# Patient Record
Sex: FEMALE | Race: WHITE | NOT HISPANIC OR LATINO | Employment: UNEMPLOYED | URBAN - METROPOLITAN AREA
[De-identification: names, ages, dates, MRNs, and addresses within clinical notes are randomized per-mention and may not be internally consistent; named-entity substitution may affect disease eponyms.]

---

## 2019-07-03 ENCOUNTER — HOSPITAL ENCOUNTER (OUTPATIENT)
Dept: LABOR AND DELIVERY | Facility: HOSPITAL | Age: 24
Discharge: HOME | End: 2019-07-03
Attending: OBSTETRICS & GYNECOLOGY

## 2019-07-03 LAB
ALBUMIN SERPL-MCNC: 2.9 G/DL (ref 3.5–5)
ALBUMIN/GLOB SERPL: 0.9 {RATIO} (ref 1.4–2.6)
ALP SERPL-CCNC: 137 U/L (ref 42–98)
ALT SERPL-CCNC: 10 U/L (ref 10–40)
ANION GAP SERPL CALC-SCNC: 15 MMOL/L (ref 8–19)
AST SERPL-CCNC: 8 U/L (ref 15–50)
BASOPHILS # BLD AUTO: 0.09 10*3/UL (ref 0–0.2)
BASOPHILS NFR BLD AUTO: 0.5 % (ref 0–3)
BILIRUB SERPL-MCNC: <0.15 MG/DL (ref 0.2–1.3)
BUN SERPL-MCNC: 4 MG/DL (ref 5–25)
BUN/CREAT SERPL: 7 {RATIO} (ref 6–20)
CALCIUM SERPL-MCNC: 9.3 MG/DL (ref 8.7–10.4)
CHLORIDE SERPL-SCNC: 104 MMOL/L (ref 99–111)
CONV ABS IMM GRAN: 0.41 10*3/UL (ref 0–0.2)
CONV CO2: 23 MMOL/L (ref 22–32)
CONV CREATININE URINE, RANDOM: 29.7 MG/DL (ref 10–300)
CONV IMMATURE GRAN: 2.1 % (ref 0–1.8)
CONV PROTEIN TO CREATININE RATIO (RANDOM URINE): 0.26 {RATIO} (ref 0–0.1)
CONV TOTAL PROTEIN: 6.1 G/DL (ref 6.3–8.2)
CREAT UR-MCNC: 0.56 MG/DL (ref 0.5–0.9)
DEPRECATED RDW RBC AUTO: 46.3 FL (ref 36.4–46.3)
EOSINOPHIL # BLD AUTO: 0.38 10*3/UL (ref 0–0.7)
EOSINOPHIL # BLD AUTO: 1.9 % (ref 0–7)
ERYTHROCYTE [DISTWIDTH] IN BLOOD BY AUTOMATED COUNT: 14.4 % (ref 11.7–14.4)
GFR SERPLBLD BASED ON 1.73 SQ M-ARVRAT: >60 ML/MIN/{1.73_M2}
GLOBULIN UR ELPH-MCNC: 3.2 G/DL (ref 2–3.5)
GLUCOSE SERPL-MCNC: 93 MG/DL (ref 65–99)
HBA1C MFR BLD: 9.5 G/DL (ref 12–16)
HCT VFR BLD AUTO: 31.7 % (ref 37–47)
LYMPHOCYTES # BLD AUTO: 2.58 10*3/UL (ref 1–5)
MCH RBC QN AUTO: 26 PG (ref 27–31)
MCHC RBC AUTO-ENTMCNC: 30 G/DL (ref 33–37)
MCV RBC AUTO: 86.6 FL (ref 81–99)
MONOCYTES # BLD AUTO: 1.4 10*3/UL (ref 0.2–1.2)
MONOCYTES NFR BLD AUTO: 7.1 % (ref 3–10)
NEUTROPHILS # BLD AUTO: 14.97 10*3/UL (ref 2–8)
NEUTROPHILS NFR BLD AUTO: 75.4 % (ref 30–85)
NRBC CBCN: 0 % (ref 0–0.7)
OSMOLALITY SERPL CALC.SUM OF ELEC: 285 MOSM/KG (ref 273–304)
PLATELET # BLD AUTO: 541 10*3/UL (ref 130–400)
PMV BLD AUTO: 10.3 FL (ref 9.4–12.3)
POTASSIUM SERPL-SCNC: 3.1 MMOL/L (ref 3.5–5.3)
PROT UR-MCNC: 7.6 MG/DL
RBC # BLD AUTO: 3.66 10*6/UL (ref 4.2–5.4)
SODIUM SERPL-SCNC: 139 MMOL/L (ref 135–147)
VARIANT LYMPHS NFR BLD MANUAL: 13 % (ref 20–45)
WBC # BLD AUTO: 19.83 10*3/UL (ref 4.8–10.8)

## 2019-07-04 LAB
PROT 24H UR-MRATE: 165 MG/(24.H) (ref 42–225)
PROT 24H UR-MRATE: 5 MG/DL (ref 0–12)
SPECIMEN VOL 24H UR: 3300 ML

## 2019-07-16 ENCOUNTER — HOSPITAL ENCOUNTER (OUTPATIENT)
Dept: LABOR AND DELIVERY | Facility: HOSPITAL | Age: 24
Discharge: HOME | End: 2019-07-16
Attending: OBSTETRICS & GYNECOLOGY

## 2020-09-17 ENCOUNTER — HOSPITAL ENCOUNTER (OUTPATIENT)
Dept: URGENT CARE | Facility: CLINIC | Age: 25
Discharge: HOME | End: 2020-09-17
Attending: PHYSICIAN ASSISTANT

## 2020-09-17 LAB
CONV HIV-1/ HIV-2: NONREACTIVE
RPR SER QL: NORMAL

## 2020-09-18 LAB
C TRACH RRNA CVX QL NAA+PROBE: NOT DETECTED
N GONORRHOEA DNA SPEC QL NAA+PROBE: DETECTED

## 2020-09-19 LAB
CONV HEPATITIS B SURFACE AG W CONFIRMATION RE: NEGATIVE
HAV IGM SERPL QL IA: NEGATIVE
HBV CORE IGM SERPL QL IA: NEGATIVE
HCV AB SER DONR QL: <0.1 S/CO RATIO (ref 0–0.9)

## 2020-12-23 ENCOUNTER — HOSPITAL ENCOUNTER (OUTPATIENT)
Dept: URGENT CARE | Facility: CLINIC | Age: 25
Discharge: HOME | End: 2020-12-23
Attending: NURSE PRACTITIONER

## 2020-12-25 LAB — SARS-COV-2 RNA SPEC QL NAA+PROBE: NOT DETECTED

## 2021-09-14 ENCOUNTER — OFFICE VISIT (OUTPATIENT)
Dept: FAMILY MEDICINE CLINIC | Facility: CLINIC | Age: 26
End: 2021-09-14

## 2021-09-14 VITALS
DIASTOLIC BLOOD PRESSURE: 80 MMHG | HEIGHT: 62 IN | TEMPERATURE: 97.5 F | BODY MASS INDEX: 45.08 KG/M2 | HEART RATE: 127 BPM | SYSTOLIC BLOOD PRESSURE: 136 MMHG | OXYGEN SATURATION: 98 % | WEIGHT: 245 LBS

## 2021-09-14 DIAGNOSIS — Z34.90 PREGNANCY, UNSPECIFIED GESTATIONAL AGE: ICD-10-CM

## 2021-09-14 DIAGNOSIS — N92.6 MISSED MENSES: Primary | ICD-10-CM

## 2021-09-14 LAB
B-HCG UR QL: POSITIVE
INTERNAL NEGATIVE CONTROL: ABNORMAL
INTERNAL POSITIVE CONTROL: ABNORMAL
Lab: ABNORMAL

## 2021-09-14 PROCEDURE — 99203 OFFICE O/P NEW LOW 30 MIN: CPT | Performed by: FAMILY MEDICINE

## 2021-09-14 PROCEDURE — 81025 URINE PREGNANCY TEST: CPT | Performed by: FAMILY MEDICINE

## 2021-09-14 RX ORDER — ALBUTEROL SULFATE 90 UG/1
2 AEROSOL, METERED RESPIRATORY (INHALATION) EVERY 4 HOURS PRN
COMMUNITY
End: 2021-10-14 | Stop reason: SDUPTHER

## 2021-09-14 RX ORDER — PNV NO.95/FERROUS FUM/FOLIC AC 28MG-0.8MG
1 TABLET ORAL DAILY
Qty: 90 TABLET | Refills: 2 | Status: SHIPPED | OUTPATIENT
Start: 2021-09-14 | End: 2022-02-11

## 2021-09-14 NOTE — PROGRESS NOTES
"Chief Complaint    Possible Pregnancy    Subjective      Lovely Daniels presents to [facility]     History of Present Illness    1.) MISSED MENSES : FDLMP - 'sometime in April 2021.' Patient reports irregular menses. Here to confirm suspected pregnancy. Also reports that she would like to switch her primary care to this office - reports 'not being happy with her current practice.'    Objective      Vital Signs:     /80   Pulse (!) 127   Temp 97.5 °F (36.4 °C)   Ht 156.2 cm (61.5\")   Wt 111 kg (245 lb)   SpO2 98%   BMI 45.54 kg/m²       Physical Exam  Vitals reviewed.   Constitutional:       General: She is not in acute distress.     Appearance: Normal appearance. She is well-developed.      Comments: Morbid obesity    HENT:      Head: Normocephalic and atraumatic.      Right Ear: Hearing and external ear normal.      Left Ear: Hearing and external ear normal.      Nose: Nose normal.   Eyes:      General: Lids are normal.         Right eye: No discharge.         Left eye: No discharge.      Conjunctiva/sclera: Conjunctivae normal.   Pulmonary:      Effort: Pulmonary effort is normal.   Abdominal:      General: There is no distension.   Musculoskeletal:         General: No swelling.      Cervical back: Neck supple.   Skin:     Coloration: Skin is not jaundiced.      Findings: No erythema.   Neurological:      Mental Status: She is alert. Mental status is at baseline.   Psychiatric:         Mood and Affect: Mood and affect normal.         Thought Content: Thought content normal.     Assessment and Plan    Diagnoses and all orders for this visit:    1. Missed menses (Primary)  Comments:  1.) (+) Urine pregnancy test here in office.   Orders:  -     POCT pregnancy, urine  -     Ambulatory Referral to Obstetrics / Gynecology    2. Pregnancy, unspecified gestational age  Comments:  1.) Start prenatal vitamins - referred to OB - return to this office for primary care after " birth.  Orders:  -     Ambulatory Referral to Obstetrics / Gynecology    Other orders  -     Prenatal Vit-Fe Fumarate-FA (Prenatal Vitamin) 27-0.8 MG tablet; Take 1 tablet by mouth Daily.  Dispense: 90 tablet; Refill: 2    Patient was given instructions and counseling regarding her condition or for health maintenance advice. Please see specific information pulled into the AVS if appropriate.

## 2021-09-21 ENCOUNTER — INITIAL PRENATAL (OUTPATIENT)
Dept: OBSTETRICS AND GYNECOLOGY | Facility: CLINIC | Age: 26
End: 2021-09-21

## 2021-09-21 VITALS
DIASTOLIC BLOOD PRESSURE: 84 MMHG | BODY MASS INDEX: 45.27 KG/M2 | WEIGHT: 246 LBS | HEIGHT: 62 IN | SYSTOLIC BLOOD PRESSURE: 128 MMHG

## 2021-09-21 DIAGNOSIS — Z32.01 PREGNANCY TEST PERFORMED, PREGNANCY CONFIRMED: Primary | ICD-10-CM

## 2021-09-21 DIAGNOSIS — E66.01 MORBID OBESITY WITH BMI OF 40.0-44.9, ADULT (HCC): ICD-10-CM

## 2021-09-21 DIAGNOSIS — Z34.80 SUPERVISION OF OTHER NORMAL PREGNANCY: ICD-10-CM

## 2021-09-21 DIAGNOSIS — O09.30 LATE PRENATAL CARE: ICD-10-CM

## 2021-09-21 PROBLEM — Z98.891 PREVIOUS CESAREAN SECTION: Status: ACTIVE | Noted: 2021-09-21

## 2021-09-21 PROBLEM — O09.299 HX OF PREECLAMPSIA, PRIOR PREGNANCY, CURRENTLY PREGNANT: Status: ACTIVE | Noted: 2021-09-21

## 2021-09-21 LAB
ABO GROUP BLD: NORMAL
B-HCG UR QL: POSITIVE
BASOPHILS # BLD AUTO: 0.09 10*3/MM3 (ref 0–0.2)
BASOPHILS NFR BLD AUTO: 0.5 % (ref 0–1.5)
BLD GP AB SCN SERPL QL: NEGATIVE
DEPRECATED RDW RBC AUTO: 46.1 FL (ref 37–54)
EOSINOPHIL # BLD AUTO: 0.39 10*3/MM3 (ref 0–0.4)
EOSINOPHIL NFR BLD AUTO: 2.3 % (ref 0.3–6.2)
ERYTHROCYTE [DISTWIDTH] IN BLOOD BY AUTOMATED COUNT: 15.6 % (ref 12.3–15.4)
GLUCOSE UR STRIP-MCNC: NEGATIVE MG/DL
HBV SURFACE AG SERPL QL IA: NORMAL
HCT VFR BLD AUTO: 32.4 % (ref 34–46.6)
HCV AB SER DONR QL: NORMAL
HGB BLD-MCNC: 10.4 G/DL (ref 12–15.9)
HIV1+2 AB SER QL: NORMAL
IMM GRANULOCYTES # BLD AUTO: 0.43 10*3/MM3 (ref 0–0.05)
IMM GRANULOCYTES NFR BLD AUTO: 2.5 % (ref 0–0.5)
INTERNAL NEGATIVE CONTROL: ABNORMAL
INTERNAL POSITIVE CONTROL: ABNORMAL
LYMPHOCYTES # BLD AUTO: 1.99 10*3/MM3 (ref 0.7–3.1)
LYMPHOCYTES NFR BLD AUTO: 11.7 % (ref 19.6–45.3)
Lab: ABNORMAL
MCH RBC QN AUTO: 26.3 PG (ref 26.6–33)
MCHC RBC AUTO-ENTMCNC: 32.1 G/DL (ref 31.5–35.7)
MCV RBC AUTO: 81.8 FL (ref 79–97)
MONOCYTES # BLD AUTO: 0.88 10*3/MM3 (ref 0.1–0.9)
MONOCYTES NFR BLD AUTO: 5.2 % (ref 5–12)
N GONORRHOEA DNA SPEC QL NAA+PROBE: NORMAL
NEUTROPHILS NFR BLD AUTO: 13.28 10*3/MM3 (ref 1.7–7)
NEUTROPHILS NFR BLD AUTO: 77.8 % (ref 42.7–76)
NRBC BLD AUTO-RTO: 0.1 /100 WBC (ref 0–0.2)
PLATELET # BLD AUTO: 330 10*3/MM3 (ref 140–450)
PMV BLD AUTO: 12.4 FL (ref 6–12)
PROT UR STRIP-MCNC: NEGATIVE MG/DL
RBC # BLD AUTO: 3.96 10*6/MM3 (ref 3.77–5.28)
RH BLD: POSITIVE
WBC # BLD AUTO: 17.06 10*3/MM3 (ref 3.4–10.8)

## 2021-09-21 PROCEDURE — G0432 EIA HIV-1/HIV-2 SCREEN: HCPCS | Performed by: ADVANCED PRACTICE MIDWIFE

## 2021-09-21 PROCEDURE — 81025 URINE PREGNANCY TEST: CPT | Performed by: ADVANCED PRACTICE MIDWIFE

## 2021-09-21 PROCEDURE — 99214 OFFICE O/P EST MOD 30 MIN: CPT | Performed by: ADVANCED PRACTICE MIDWIFE

## 2021-09-21 PROCEDURE — 87086 URINE CULTURE/COLONY COUNT: CPT | Performed by: ADVANCED PRACTICE MIDWIFE

## 2021-09-21 PROCEDURE — 86803 HEPATITIS C AB TEST: CPT | Performed by: ADVANCED PRACTICE MIDWIFE

## 2021-09-21 RX ORDER — ASPIRIN 81 MG/1
81 TABLET ORAL DAILY
Qty: 30 TABLET | Refills: 5 | Status: SHIPPED | OUTPATIENT
Start: 2021-09-21 | End: 2022-01-10 | Stop reason: HOSPADM

## 2021-09-21 NOTE — PROGRESS NOTES
"OB Initial Visit    CC- Here for care of current pregnancy     TIM Finalized: Unable to finalize dates, needs dating U/S    Subjective:  26 y.o.  presenting for her first obstetrical visit.    LMP: Patient's last menstrual period was 2021 (approximate). irregular    COMPLAINS OF: Pt has no complaints, is doing well    Reviewed and updated:  OBHx, GYNHx (STDs), PMHx, Medications, Allergies, PSHx, Social Hx, Preventative Hx (PAP), Hx of abuse/safe environment, Vaccine Hx including hx of chickenpox or vaccine, Genetic Hx (pt, FOB, both families).        Objective:  /84   Ht 157.5 cm (62\")   Wt 112 kg (246 lb)   LMP 2021 (Approximate)   BMI 44.99 kg/m²   General- NAD, alert and oriented, appropriate  Psych- Normal mood, good memory  Neck- No masses, no thyroid enlargement  CV- Regular rhythm, no murnurs  Resp- CTA to bases, no wheezes  Abdomen- Soft, non distended, non tender, no masses     External genitalia- Normal, no lesions  Urethra- Normal, no masses, non tender  Vagina- Normal, no discharge  Bladder- Normal, no masses, non tender  Cvx- Normal, no lesions, no discharge, no CMT  Uterus- Consistent with dates  Adnexa- Normal, no mass, non tender    Lymphatic- No palpable neck, axillary, or groin nodes  Ext- No edema, no cyanosis    Skin- No lesions, no rashes, no acanthosis nigricans      Assessment and Plan:  Diagnoses and all orders for this visit:    1. Pregnancy test performed, pregnancy confirmed (Primary)  -     POC Pregnancy, Urine    2. Supervision of other normal pregnancy  -     POC Urinalysis Dipstick  -     Urine Culture - Urine, Urine, Clean Catch  -     IGP,CtNgTv,rfx Aptima HPV ASCU  -     OB Panel With HIV  -     US Ob Detail Fetal Anatomy Single or First Gestation; Future    3. Late prenatal care    4. Morbid obesity with BMI of 40.0-44.9, adult (CMS/[facility])    Other orders  -     aspirin (aspirin) 81 MG EC tablet; Take 1 tablet by mouth Daily.  Dispense: 30 tablet; Refill: " 5            21w3d    Genetic Screening: Declined    Counseling: Nutrition discussed, calories, activity/exercise in pregnancy, Discussed dietary restrictions/safety food preparation in pregnancy, Reviewed what to expect prenatal visits, office providers, Appropriate trimester precautions provided, N/V, vag bleeding, cramping, Questions answered    Labs: Prenatal labs, cultures, and PAP performed (prn)    Return in about 2 weeks (around 10/5/2021) for Next scheduled follow up.      Adelina Jimenez CNM  09/21/2021

## 2021-09-22 ENCOUNTER — TELEPHONE (OUTPATIENT)
Dept: OBSTETRICS AND GYNECOLOGY | Facility: CLINIC | Age: 26
End: 2021-09-22

## 2021-09-22 LAB
BACTERIA SPEC AEROBE CULT: NO GROWTH
RPR SER QL: NORMAL

## 2021-09-22 RX ORDER — FERROUS SULFATE 325(65) MG
325 TABLET ORAL EVERY OTHER DAY
Qty: 30 TABLET | Refills: 5 | Status: SHIPPED | OUTPATIENT
Start: 2021-09-22 | End: 2022-02-11

## 2021-09-22 NOTE — TELEPHONE ENCOUNTER
Script sent, thanks Writer attempted to call report, RN was busy. Writer is also having EDT attempt a new line at this time. TL aware.

## 2021-09-22 NOTE — TELEPHONE ENCOUNTER
----- Message from Adelina Jimenez CNM sent at 9/22/2021  9:17 AM EDT -----  This patient will need a pharmacy entered so I can send a script for iron. Thanks

## 2021-09-23 LAB — RUBV IGG SERPL IA-ACNC: 2.61 INDEX

## 2021-09-27 LAB
C TRACH RRNA CVX QL NAA+PROBE: NEGATIVE
CONV .: NORMAL
CYTOLOGIST CVX/VAG CYTO: NORMAL
CYTOLOGY CVX/VAG DOC CYTO: NORMAL
CYTOLOGY CVX/VAG DOC THIN PREP: NORMAL
DX ICD CODE: NORMAL
HIV 1 & 2 AB SER-IMP: NORMAL
N GONORRHOEA RRNA CVX QL NAA+PROBE: NEGATIVE
OTHER STN SPEC: NORMAL
PATHOLOGIST CVX/VAG CYTO: NORMAL
STAT OF ADQ CVX/VAG CYTO-IMP: NORMAL
T VAGINALIS RRNA SPEC QL NAA+PROBE: NEGATIVE

## 2021-10-01 ENCOUNTER — TELEPHONE (OUTPATIENT)
Dept: OBSTETRICS AND GYNECOLOGY | Facility: CLINIC | Age: 26
End: 2021-10-01

## 2021-10-01 RX ORDER — METRONIDAZOLE 500 MG/1
500 TABLET ORAL 2 TIMES DAILY
Qty: 14 TABLET | Refills: 0 | Status: SHIPPED | OUTPATIENT
Start: 2021-10-01 | End: 2021-10-08

## 2021-10-01 NOTE — TELEPHONE ENCOUNTER
----- Message from Adelina Jimenez CNM sent at 10/1/2021 10:06 AM EDT -----  Please notify patient of positive Bacterial Vaginosis and to  prescription from pharmacy for Flagyl. Thanks

## 2021-10-04 ENCOUNTER — TELEPHONE (OUTPATIENT)
Dept: OBSTETRICS AND GYNECOLOGY | Facility: CLINIC | Age: 26
End: 2021-10-04

## 2021-10-06 NOTE — TELEPHONE ENCOUNTER
Caller: Lovely Daniels    Relationship: Self      Medication requested (name and dosage):   ALBUTEROL FOR THE NEBULIZER     Pharmacy where request should be sent:   [address] - [phone]  - [phone]   [phone]    Additional details provided by patient: PATIENT SAID SHE HAD TESTED POSITVE FOR COVID AND HAD BEEN USING HER NEBULIZER TO HELP HER BREATHING AND SHE IS STILL HAVE A LITTLE ISSUE WITH THE BREATHING AND WOULD LIKE TO HAVE THIS REFILLED     Best call back number: [phone]    Does the patient have less than a 3 day supply:  [x] Yes  [] No    Tricia TAYLOR Rep   10/06/21 11:29 EDT

## 2021-10-14 ENCOUNTER — TELEPHONE (OUTPATIENT)
Dept: FAMILY MEDICINE CLINIC | Facility: CLINIC | Age: 26
End: 2021-10-14

## 2021-10-14 RX ORDER — ALBUTEROL SULFATE 90 UG/1
2 AEROSOL, METERED RESPIRATORY (INHALATION) EVERY 4 HOURS PRN
Qty: 81 G | Refills: 0 | Status: SHIPPED | OUTPATIENT
Start: 2021-10-14 | End: 2021-11-13

## 2021-10-14 NOTE — TELEPHONE ENCOUNTER
[email or web address], [address] - [phone]  - [phone]  [phone]    Pharmacy representative name: MELO    Pharmacy representative phone number: [phone]    What medication are you calling in regards to: albuterol sulfate  (90 Base) MCG/ACT inhaler    What question does the pharmacy have:CLARIFICATION OF QUANTITY, SENT AS 81 GRAMS AND THESE ARE 18 GRAMS

## 2021-10-15 ENCOUNTER — ROUTINE PRENATAL (OUTPATIENT)
Dept: OBSTETRICS AND GYNECOLOGY | Facility: CLINIC | Age: 26
End: 2021-10-15

## 2021-10-15 VITALS — WEIGHT: 259 LBS | DIASTOLIC BLOOD PRESSURE: 90 MMHG | BODY MASS INDEX: 47.37 KG/M2 | SYSTOLIC BLOOD PRESSURE: 138 MMHG

## 2021-10-15 DIAGNOSIS — E66.01 MORBID OBESITY WITH BMI OF 40.0-44.9, ADULT (HCC): ICD-10-CM

## 2021-10-15 DIAGNOSIS — Z34.80 SUPERVISION OF OTHER NORMAL PREGNANCY, ANTEPARTUM: ICD-10-CM

## 2021-10-15 DIAGNOSIS — O09.30 LATE PRENATAL CARE: Primary | ICD-10-CM

## 2021-10-15 LAB
GLUCOSE UR STRIP-MCNC: NEGATIVE MG/DL
PROT UR STRIP-MCNC: NEGATIVE MG/DL

## 2021-10-15 PROCEDURE — 99214 OFFICE O/P EST MOD 30 MIN: CPT | Performed by: ADVANCED PRACTICE MIDWIFE

## 2021-10-15 RX ORDER — METRONIDAZOLE 500 MG/1
TABLET ORAL
COMMUNITY
End: 2021-10-29

## 2021-10-15 NOTE — PROGRESS NOTES
OB FOLLOW UP  CC- Here for care of pregnancy        Lovely Daniels is a 26 y.o.  24w6d patient being seen today for her obstetrical follow up visit. Patient reports no complaints.     Anatomy ultrasound reviewed, EFW 35.5%, all anatomy visualized within normal limits except unable to visualize profile and RVOT, will repeat ultrasound at next office visit      Her prenatal care is complicated by (and status) :    Patient Active Problem List   Diagnosis   • Morbid obesity with BMI of 40.0-44.9, adult (HCC)   • Hx of preeclampsia, prior pregnancy, currently pregnant   • Previous  section       ROS -   Patient Reports : No Problems  Patient Denies: Loss of Fluid, Vaginal Spotting and Contractions  Fetal Movement : normal  All other systems reviewed and are negative.       The additional following portions of the patient's history were reviewed and updated as appropriate: allergies, current medications, past family history, past medical history, past social history, past surgical history and problem list.    I have reviewed and agree with the HPI, ROS, and historical information as entered above. Adelina Jimenez CNM    /90   Wt 117 kg (259 lb)   LMP 2021 (Approximate)   BMI 47.37 kg/m²       EXAM:     FHT: 155 BPM   Uterine Size: 25 cm  Pelvic Exam: No    Urine glucose/protein: See prenatal flowsheet       Assessment and Plan    Problem List Items Addressed This Visit        Endocrine and Metabolic    Morbid obesity with BMI of 40.0-44.9, adult (HCC)      Other Visit Diagnoses     Late prenatal care    -  Primary    Relevant Orders    POC Urinalysis Dipstick (Completed)    Supervision of other normal pregnancy, antepartum        Relevant Orders    Gestational Diabetes Screen    CBC (No Diff)    US Ob 14 + Weeks Single or First Gestation          1. Pregnancy at 24w6d  2. Fetal status reassuring.   3. Patient to have Glucola and CBC done at the hospital this week due to no lab available in  office today  4. Return in about 2 weeks (around 10/29/2021) for Repeat U/S for profile and RVOT with F/U.    Adelina Jimenez CNM  10/15/2021

## 2021-10-29 ENCOUNTER — ROUTINE PRENATAL (OUTPATIENT)
Dept: OBSTETRICS AND GYNECOLOGY | Facility: CLINIC | Age: 26
End: 2021-10-29

## 2021-10-29 VITALS — WEIGHT: 261 LBS | SYSTOLIC BLOOD PRESSURE: 133 MMHG | DIASTOLIC BLOOD PRESSURE: 91 MMHG | BODY MASS INDEX: 47.74 KG/M2

## 2021-10-29 DIAGNOSIS — O09.299 HX OF PREECLAMPSIA, PRIOR PREGNANCY, CURRENTLY PREGNANT: ICD-10-CM

## 2021-10-29 DIAGNOSIS — J45.909 UNCOMPLICATED ASTHMA, UNSPECIFIED ASTHMA SEVERITY, UNSPECIFIED WHETHER PERSISTENT: ICD-10-CM

## 2021-10-29 DIAGNOSIS — Z98.891 PREVIOUS CESAREAN SECTION: ICD-10-CM

## 2021-10-29 DIAGNOSIS — Z34.80 SUPERVISION OF OTHER NORMAL PREGNANCY: Primary | ICD-10-CM

## 2021-10-29 DIAGNOSIS — E66.01 MORBID OBESITY WITH BMI OF 45.0-49.9, ADULT (HCC): ICD-10-CM

## 2021-10-29 DIAGNOSIS — F17.200 SMOKER: ICD-10-CM

## 2021-10-29 DIAGNOSIS — O09.32 SUPERVISION OF PREGNANCY WITH INSUFFICIENT ANTENATAL CARE IN SECOND TRIMESTER: ICD-10-CM

## 2021-10-29 DIAGNOSIS — U07.1 COVID-19: ICD-10-CM

## 2021-10-29 LAB
GLUCOSE UR STRIP-MCNC: NEGATIVE MG/DL
PROT UR STRIP-MCNC: NEGATIVE MG/DL

## 2021-10-29 PROCEDURE — 99214 OFFICE O/P EST MOD 30 MIN: CPT | Performed by: STUDENT IN AN ORGANIZED HEALTH CARE EDUCATION/TRAINING PROGRAM

## 2021-10-29 NOTE — PROGRESS NOTES
OB FOLLOW UP  Complaint No chief complaint on file.           Lovely Daniels is a 26 y.o.  26w6d patient being seen today for her obstetrical follow up visit. Patient denies decreased fetal movement, contractions, loss of fluid or vaginal bleeding.  Patient denies any headache, visual disturbances, new onset nausea vomiting, right upper quadrant pain, or new onset swelling.      Her prenatal care is complicated by (and status) :    Patient Active Problem List   Diagnosis   • Morbid obesity with BMI of 45.0-49.9, adult ([facility])   • Hx of preeclampsia, prior pregnancy, currently pregnant   • Previous  section   • COVID-19   • Asthma   • Smoker   • Supervision of pregnancy with insufficient  care in second trimester       All other systems reviewed and are negative.     The additional following portions of the patient's history were reviewed and updated as appropriate: allergies, current medications, past family history, past medical history, past social history, past surgical history and problem list.      EXAM:     Vital signs: /91   Wt 118 kg (261 lb)   LMP 2021 (Approximate)   BMI 47.74 kg/m²   Appearance/psychiatric: To be in no distress  Constitutional: The patient is well nourished.  Cardiovascular: She does not have edema.  Respiratory: Respiratory effort is normal.  Gastrointestinal: Abdomen is soft, gravid, nontender, no rashes, heart tones are present, fundal height is I would be obtained secondary to obesity    Pelvic Exam: No    Urine glucose/protein: See prenatal flowsheet       Assessment and Plan    Problem List Items Addressed This Visit        Endocrine and Metabolic    Morbid obesity with BMI of 45.0-49.9, adult ([facility])    Overview     10/29/2021 question of exercise in pregnancy.  Recommendation of 30 minutes x 5 days a week            Gravid and     Supervision of pregnancy with insufficient  care in second trimester    Overview     Initial  visit:  • PNL: wnl   • PAP/GC/CT/Urine culture:  • Blood type: A+/neg   • Hx of HSV: no     Genetic testing:    •     Vaccinations:  • COVID: Recommendation made 10/29/2021   • Influenza: Recommendation made 10/29/2021     24-28 weeks:  10/29/2021  • 1hr GCT:  • Hct/Plt:  • Tdap Rx -given 10/29/2021   • Rhogam indicated:  No     Third Trimester:  • GBS  • Breast pump:  • Contraception: desires tubal ligation     Ultrasound:  • Dating:   • Anatomy:   • Follow up:   o 10/29/2021  (45) AMY 15.6 breech anterior fundal placenta.  Profile left ventricular outflow tract and four-chamber heart within normal limits.  Unable to visualize right ventricular outflow tract.  Repeat ultrasound in 4 weeks for RVOT    PLAN:               Previous  section    Overview     10/29/2021 Desires repeat  section; would like had tubes tied.  Needs to sign paperwork         Hx of preeclampsia, prior pregnancy, currently pregnant    Overview     10/29/2021 compliant with aspirin.  Second elevation in blood pressure today 10/29/2021 patient asymptomatic.  Will get baseline labs and firms of CBC and CMP and UPCR.  If elevated UPCR consider doing 24-hour urine collection.         Relevant Orders    Comprehensive Metabolic Panel    Protein / Creatinine Ratio, Urine - Urine, Clean Catch       Pulmonary and Pneumonias    Asthma    Relevant Medications    albuterol sulfate  (90 Base) MCG/ACT inhaler       Tobacco    Smoker    Overview     10/29/2021 3 a day. Recommend cessation.              Other    COVID-19    Overview     10/29/2021 history Covid in August.  Recommendation for Covid vaccination as well as influenza vaccination           Other Visit Diagnoses     Supervision of other normal pregnancy    -  Primary    Relevant Medications    Tdap (ADACEL) 5-2-15.5 LF-MCG/0.5 injection    Other Relevant Orders    POC Urinalysis Dipstick (Completed)    Gestational Diabetes Screen    CBC (No Diff)    US Ob Limited 1 +  Fetuses          Impression  1. Pregnancy at 26w6d  2. Fetal status reassuring.   3. Activity and Exercise discussed.    Plan  1.  Reviewed ultrasound with patient.  Plan for repeat in 4 weeks for RVOT  2.  Smoking cessation discussed with patient.  Discussed methods of being able to stop smoking including setting a quit date, as well as getting rid of objects.  1 800 stop smoking provided to patient  3.  Needs 24-week labs.  Unable to be obtained today is going to get on Monday.  4.  Elevated blood pressure today with concerns for chronic hypertension versus gestational hypertension/preeclampsia.  Patient asymptomatic.  We will get baseline labs with UPCR.  If elevation UPCR would recommend getting 24-hour urine protein collection.  5.  Follow-up 2 weeks    Patient was counseled to the following pregnancy precautions:  • Decreased fetal movement, if concern for decreased fetal movement please perform fetal kick counts you are looking for 10 movements in 2 hours.  If concern for fetal movement and not meeting that criteria, please present to triage for evaluation.  • Contractions occurring every 5 minutes for over an hour, lasting 30 to 60 seconds and progressively causing more discomfort, please seek medical attention to rule out labor  • If you believe that your water is broken, place a sanitary pad.  If pad fills in short period of time i.e. less than 5 minutes, take off pad placed another pad.  If this is saturated please present for rule out rupture of membranes  • Vaginal bleeding can be normal in pregnancy, this usually takes a form of spotting.  If having heavier bleeding like a menstrual period please present for evaluation; especially in light of severe abdominal pain this could represent a placental abruption.  • Keep all scheduled appointments as recommended.        Jaguar Dumont MD  10/29/2021

## 2021-11-01 ENCOUNTER — LAB (OUTPATIENT)
Dept: LAB | Facility: HOSPITAL | Age: 26
End: 2021-11-01

## 2021-11-01 DIAGNOSIS — Z34.80 SUPERVISION OF OTHER NORMAL PREGNANCY: ICD-10-CM

## 2021-11-01 DIAGNOSIS — Z34.80 SUPERVISION OF OTHER NORMAL PREGNANCY, ANTEPARTUM: ICD-10-CM

## 2021-11-01 DIAGNOSIS — O09.299 HX OF PREECLAMPSIA, PRIOR PREGNANCY, CURRENTLY PREGNANT: ICD-10-CM

## 2021-11-01 PROBLEM — O99.019 MATERNAL ANEMIA IN PREGNANCY, ANTEPARTUM: Status: ACTIVE | Noted: 2021-11-01

## 2021-11-01 LAB
ALBUMIN SERPL-MCNC: 3.2 G/DL (ref 3.5–5.2)
ALBUMIN/GLOB SERPL: 1 G/DL
ALP SERPL-CCNC: 139 U/L (ref 39–117)
ALT SERPL W P-5'-P-CCNC: 13 U/L (ref 1–33)
ANION GAP SERPL CALCULATED.3IONS-SCNC: 11.8 MMOL/L (ref 5–15)
AST SERPL-CCNC: 11 U/L (ref 1–32)
BILIRUB SERPL-MCNC: <0.2 MG/DL (ref 0–1.2)
BUN SERPL-MCNC: 8 MG/DL (ref 6–20)
BUN/CREAT SERPL: 14.5 (ref 7–25)
CALCIUM SPEC-SCNC: 8.8 MG/DL (ref 8.6–10.5)
CHLORIDE SERPL-SCNC: 102 MMOL/L (ref 98–107)
CO2 SERPL-SCNC: 22.2 MMOL/L (ref 22–29)
CREAT SERPL-MCNC: 0.55 MG/DL (ref 0.57–1)
CREAT UR-MCNC: 84.9 MG/DL
DEPRECATED RDW RBC AUTO: 47.9 FL (ref 37–54)
ERYTHROCYTE [DISTWIDTH] IN BLOOD BY AUTOMATED COUNT: 15.7 % (ref 12.3–15.4)
GFR SERPL CREATININE-BSD FRML MDRD: 136 ML/MIN/1.73
GLOBULIN UR ELPH-MCNC: 3.1 GM/DL
GLUCOSE 1H P GLC SERPL-MCNC: 158 MG/DL (ref 65–139)
GLUCOSE BLDC GLUCOMTR-MCNC: 113 MG/DL (ref 70–99)
GLUCOSE P FAST SERPL-MCNC: 115 MG/DL (ref 65–94)
GLUCOSE SERPL-MCNC: 115 MG/DL (ref 65–99)
HCT VFR BLD AUTO: 31.5 % (ref 34–46.6)
HGB BLD-MCNC: 9.9 G/DL (ref 12–15.9)
MCH RBC QN AUTO: 26 PG (ref 26.6–33)
MCHC RBC AUTO-ENTMCNC: 31.4 G/DL (ref 31.5–35.7)
MCV RBC AUTO: 82.7 FL (ref 79–97)
PLATELET # BLD AUTO: 332 10*3/MM3 (ref 140–450)
PMV BLD AUTO: 12.5 FL (ref 6–12)
POTASSIUM SERPL-SCNC: 3.5 MMOL/L (ref 3.5–5.2)
PROT SERPL-MCNC: 6.3 G/DL (ref 6–8.5)
PROT UR-MCNC: 14.7 MG/DL
PROT/CREAT UR: 0.2 MG/G{CREAT}
RBC # BLD AUTO: 3.81 10*6/MM3 (ref 3.77–5.28)
SODIUM SERPL-SCNC: 136 MMOL/L (ref 136–145)
WBC # BLD AUTO: 14.49 10*3/MM3 (ref 3.4–10.8)

## 2021-11-01 PROCEDURE — 82570 ASSAY OF URINE CREATININE: CPT | Performed by: STUDENT IN AN ORGANIZED HEALTH CARE EDUCATION/TRAINING PROGRAM

## 2021-11-01 PROCEDURE — 82962 GLUCOSE BLOOD TEST: CPT

## 2021-11-01 PROCEDURE — 82950 GLUCOSE TEST: CPT

## 2021-11-01 PROCEDURE — 85027 COMPLETE CBC AUTOMATED: CPT

## 2021-11-01 PROCEDURE — 82947 ASSAY GLUCOSE BLOOD QUANT: CPT

## 2021-11-01 PROCEDURE — 36415 COLL VENOUS BLD VENIPUNCTURE: CPT

## 2021-11-01 PROCEDURE — 84156 ASSAY OF PROTEIN URINE: CPT | Performed by: STUDENT IN AN ORGANIZED HEALTH CARE EDUCATION/TRAINING PROGRAM

## 2021-11-01 PROCEDURE — 80053 COMPREHEN METABOLIC PANEL: CPT

## 2021-11-02 ENCOUNTER — TELEPHONE (OUTPATIENT)
Dept: OBSTETRICS AND GYNECOLOGY | Facility: CLINIC | Age: 26
End: 2021-11-02

## 2021-11-02 NOTE — TELEPHONE ENCOUNTER
----- Message from Jaguar Dumont MD sent at 11/1/2021  1:59 PM EDT -----  Elevated UPCR with recommendation for 24-hour urine collection.  Please notify patient

## 2021-11-03 ENCOUNTER — TELEPHONE (OUTPATIENT)
Dept: OBSTETRICS AND GYNECOLOGY | Facility: CLINIC | Age: 26
End: 2021-11-03

## 2021-11-03 NOTE — TELEPHONE ENCOUNTER
----- Message from Jaguar Dumont MD sent at 11/1/2021  1:57 PM EDT -----  Patient failed 1 hour glucose testing.  Will need to have 3-hour testing.

## 2021-11-05 ENCOUNTER — TELEPHONE (OUTPATIENT)
Dept: OBSTETRICS AND GYNECOLOGY | Facility: CLINIC | Age: 26
End: 2021-11-05

## 2021-11-05 NOTE — TELEPHONE ENCOUNTER
Patient was in office for an ultrasound. I discussed with patient the need for a 24 hour urine and patient was given the needed supplies. Patient advised to return the sample at her 11/10 appointment.

## 2021-11-10 ENCOUNTER — TELEPHONE (OUTPATIENT)
Dept: OBSTETRICS AND GYNECOLOGY | Facility: CLINIC | Age: 26
End: 2021-11-10

## 2021-11-10 NOTE — TELEPHONE ENCOUNTER
Pt called in requesting her 3hour glucose results. Gave pt glucose reports and that is was normal. Pt understood results.

## 2021-11-22 ENCOUNTER — ROUTINE PRENATAL (OUTPATIENT)
Dept: OBSTETRICS AND GYNECOLOGY | Facility: CLINIC | Age: 26
End: 2021-11-22

## 2021-11-22 VITALS — SYSTOLIC BLOOD PRESSURE: 140 MMHG | WEIGHT: 274 LBS | BODY MASS INDEX: 50.12 KG/M2 | DIASTOLIC BLOOD PRESSURE: 88 MMHG

## 2021-11-22 DIAGNOSIS — R12 HEARTBURN: ICD-10-CM

## 2021-11-22 DIAGNOSIS — Z3A.30 30 WEEKS GESTATION OF PREGNANCY: ICD-10-CM

## 2021-11-22 DIAGNOSIS — O09.33 INSUFFICIENT PRENATAL CARE IN THIRD TRIMESTER: ICD-10-CM

## 2021-11-22 DIAGNOSIS — O99.019 MATERNAL ANEMIA IN PREGNANCY, ANTEPARTUM: ICD-10-CM

## 2021-11-22 DIAGNOSIS — F17.200 SMOKER: Primary | ICD-10-CM

## 2021-11-22 LAB
GLUCOSE UR STRIP-MCNC: NEGATIVE MG/DL
LEUKOCYTE EST, POC: NEGATIVE
NITRITE UR-MCNC: NEGATIVE MG/ML
PROT UR STRIP-MCNC: NEGATIVE MG/DL

## 2021-11-22 PROCEDURE — 99213 OFFICE O/P EST LOW 20 MIN: CPT | Performed by: NURSE PRACTITIONER

## 2021-11-22 RX ORDER — FAMOTIDINE 20 MG/1
20 TABLET, FILM COATED ORAL 2 TIMES DAILY PRN
Qty: 60 TABLET | Refills: 5 | Status: SHIPPED | OUTPATIENT
Start: 2021-11-22 | End: 2022-02-11

## 2021-11-22 NOTE — PROGRESS NOTES
OB FOLLOW UP        Here for OB follow up visit    Subjective:   • No complaints   • Has not collected 24-hour urine yet  • Does need medication for heartburn    Objective:  /88   Wt 124 kg (274 lb)   LMP 2021 (Approximate)   BMI 50.12 kg/m²   Uterine Size: size equals dates  FHT: 110-160 BPM    See OB flow for LE edema, cvx exam if performed, and Upro/Uglu    Assessment and Plan:  30w2d  Reassuring pregnancy progress.  Questions answered.  Diagnoses and all orders for this visit:    1. Smoker (Primary)  Overview:  10/29/2021 3 a day. Recommend cessation.      Orders:  -     POC Urinalysis Dipstick    2. Supervision of pregnancy with insufficient  care in second trimester  Overview:  Initial visit:  • PNL: wnl   • PAP/GC/CT/Urine culture:  • Blood type: A+/neg   • Hx of HSV: no     Genetic testing:    •     Vaccinations:  • COVID: Recommendation made 10/29/2021   • Influenza: Recommendation made 10/29/2021     24-28 weeks:  10/29/2021  • 1hr GCT:  • Hct/Plt:  • Tdap Rx -given 10/29/2021   • Rhogam indicated:  No     Third Trimester:  • GBS  • Breast pump:  • Contraception: desires tubal ligation     Ultrasound:  • Dating:   • Anatomy:   • Follow up:   o 10/29/2021  (45) AMY 15.6 breech anterior fundal placenta.  Profile left ventricular outflow tract and four-chamber heart within normal limits.  Unable to visualize right ventricular outflow tract.  Repeat ultrasound in 4 weeks for RVOT  o 2021 -EFW 1155 (52.8) RVOT appears within normal limits    PLAN:          Orders:  -     POC Urinalysis Dipstick    3. Maternal anemia in pregnancy, antepartum  Overview:  2021 H&H essentially unchanged from 1 month ago.  Reports compliance with iron at last visit.  Recheck CBC in 1 month if CBC remains low recommendation for iron infusion.    Orders:  -     POC Urinalysis Dipstick    4. 30 weeks gestation of pregnancy  -     POC Urinalysis Dipstick    5. Heartburn  -     famotidine (PEPCID)  20 MG tablet; Take 1 tablet by mouth 2 (Two) Times a Day As Needed for Heartburn.  Dispense: 60 tablet; Refill: 5      Counseling:    • OB precautions, leaking, VB, gm ingram vs PTL/Labor  • FKC  • HTN precautions, HA, vision change, RUQ/epigastric pain, edema  • Continue PNV.  Importance of healthy eating and exercise.  • Encourage to collect and submit 24-hour    Return in about 2 weeks (around 12/6/2021) for Next scheduled follow up.            Sergei Hudson, APRN  11/22/2021    [facility]  [facility]  [address] [address]  Dept: [phone]  Loc: [phone]

## 2021-12-09 ENCOUNTER — ROUTINE PRENATAL (OUTPATIENT)
Dept: OBSTETRICS AND GYNECOLOGY | Facility: CLINIC | Age: 26
End: 2021-12-09

## 2021-12-09 VITALS — WEIGHT: 281 LBS | BODY MASS INDEX: 51.4 KG/M2 | SYSTOLIC BLOOD PRESSURE: 138 MMHG | DIASTOLIC BLOOD PRESSURE: 83 MMHG

## 2021-12-09 DIAGNOSIS — O99.019 MATERNAL ANEMIA IN PREGNANCY, ANTEPARTUM: ICD-10-CM

## 2021-12-09 DIAGNOSIS — J45.909 UNCOMPLICATED ASTHMA, UNSPECIFIED ASTHMA SEVERITY, UNSPECIFIED WHETHER PERSISTENT: ICD-10-CM

## 2021-12-09 DIAGNOSIS — O09.32 SUPERVISION OF PREGNANCY WITH INSUFFICIENT ANTENATAL CARE IN SECOND TRIMESTER: ICD-10-CM

## 2021-12-09 DIAGNOSIS — F17.200 SMOKER: ICD-10-CM

## 2021-12-09 LAB
GLUCOSE UR STRIP-MCNC: NEGATIVE MG/DL
PROT UR STRIP-MCNC: NEGATIVE MG/DL

## 2021-12-09 PROCEDURE — 99212 OFFICE O/P EST SF 10 MIN: CPT | Performed by: OBSTETRICS & GYNECOLOGY

## 2021-12-12 NOTE — PROGRESS NOTES
OB FOLLOW UP  CC- Here for care of pregnancy        Lovely Daniels is a 26 y.o.  33w0d patient being seen today for her obstetrical follow up visit. Patient reports no complaints.     Her prenatal care is complicated by (and status) :    Patient Active Problem List   Diagnosis   • Morbid obesity with BMI of 45.0-49.9, adult (HCC)   • Hx of preeclampsia, prior pregnancy, currently pregnant   • Previous  section   • COVID-19   • Asthma   • Smoker   • Supervision of pregnancy with insufficient  care in second trimester   • Maternal anemia in pregnancy, antepartum       Flu Status: Desires at future appt  Covid Status:    ROS -   Patient Reports : No Problems  Patient Denies: Loss of Fluid, Vaginal Spotting, Vision Changes, Headaches, Nausea , Vomiting , Contractions and Epigastric pain  Fetal Movement : normal  All other systems reviewed and are negative.       The additional following portions of the patient's history were reviewed and updated as appropriate: allergies, current medications, past family history, past medical history, past social history, past surgical history and problem list.    I have reviewed and agree with the HPI, ROS, and historical information as entered above. Dariana Rosenthal, DO    /83   Wt 127 kg (281 lb)   LMP 2021 (Approximate)   BMI 51.40 kg/m²       EXAM:     FHT: 141 BPM   Uterine Size: 36 cm  Pelvic Exam: No    Urine glucose/protein: See prenatal flowsheet       Assessment and Plan  Diagnoses and all orders for this visit:    1. Maternal anemia in pregnancy, antepartum  Overview:  2021 H&H essentially unchanged from 1 month ago.  Reports compliance with iron at last visit.  Recheck CBC in 1 month if CBC remains low recommendation for iron infusion.      2. Uncomplicated asthma, unspecified asthma severity, unspecified whether persistent    3. Smoker  Overview:  10/29/2021 3 a day. Recommend cessation.        4. Supervision of pregnancy with  insufficient  care in second trimester  Overview:  Initial visit:  • PNL: wnl   • PAP/GC/CT/Urine culture:  • Blood type: A+/neg   • Hx of HSV: no     Genetic testing:    •     Vaccinations:  • COVID: Recommendation made 10/29/2021   • Influenza: Recommendation made 10/29/2021     24-28 weeks:  10/29/2021  • 1hr GCT:  • Hct/Plt:  • Tdap Rx -given 10/29/2021   • Rhogam indicated:  No     Third Trimester:  • GBS  • Breast pump:  • Contraception: desires tubal ligation     Ultrasound:  • Dating:   • Anatomy:   • Follow up:   o 10/29/2021  (45) AMY 15.6 breech anterior fundal placenta.  Profile left ventricular outflow tract and four-chamber heart within normal limits.  Unable to visualize right ventricular outflow tract.  Repeat ultrasound in 4 weeks for RVOT  o 2021 -EFW 1155 (52.8) RVOT appears within normal limits    PLAN:          Orders:  -     POC Urinalysis Dipstick         1. Pregnancy at 33w0d  2. Fetal status reassuring.   3. Activity and Exercise discussed.  4. Return for Please schedule q 2 wks 36 wk & wks til del, PLEASE SCHEDULE ULTRASOUND AT 36-37 WEEKS.    Dariana Rosenthal, DO  2021

## 2021-12-17 ENCOUNTER — PREP FOR SURGERY (OUTPATIENT)
Dept: OTHER | Facility: HOSPITAL | Age: 26
End: 2021-12-17

## 2021-12-23 ENCOUNTER — TELEPHONE (OUTPATIENT)
Dept: OBSTETRICS AND GYNECOLOGY | Facility: CLINIC | Age: 26
End: 2021-12-23

## 2021-12-23 ENCOUNTER — ROUTINE PRENATAL (OUTPATIENT)
Dept: OBSTETRICS AND GYNECOLOGY | Facility: CLINIC | Age: 26
End: 2021-12-23

## 2021-12-23 ENCOUNTER — HOSPITAL ENCOUNTER (OUTPATIENT)
Facility: HOSPITAL | Age: 26
Discharge: HOME | End: 2021-12-23
Attending: OBSTETRICS & GYNECOLOGY | Admitting: OBSTETRICS & GYNECOLOGY

## 2021-12-23 VITALS — SYSTOLIC BLOOD PRESSURE: 147 MMHG | WEIGHT: 287 LBS | BODY MASS INDEX: 52.49 KG/M2 | DIASTOLIC BLOOD PRESSURE: 89 MMHG

## 2021-12-23 VITALS
TEMPERATURE: 97.8 F | RESPIRATION RATE: 18 BRPM | SYSTOLIC BLOOD PRESSURE: 138 MMHG | HEART RATE: 95 BPM | DIASTOLIC BLOOD PRESSURE: 77 MMHG | OXYGEN SATURATION: 100 %

## 2021-12-23 DIAGNOSIS — O09.32 SUPERVISION OF PREGNANCY WITH INSUFFICIENT ANTENATAL CARE IN SECOND TRIMESTER: ICD-10-CM

## 2021-12-23 DIAGNOSIS — F17.200 SMOKER: ICD-10-CM

## 2021-12-23 DIAGNOSIS — Z34.80 SUPERVISION OF OTHER NORMAL PREGNANCY: Primary | ICD-10-CM

## 2021-12-23 DIAGNOSIS — O09.299 HX OF PREECLAMPSIA, PRIOR PREGNANCY, CURRENTLY PREGNANT: ICD-10-CM

## 2021-12-23 DIAGNOSIS — O99.019 MATERNAL ANEMIA IN PREGNANCY, ANTEPARTUM: ICD-10-CM

## 2021-12-23 PROBLEM — O13.3 GESTATIONAL HYPERTENSION, THIRD TRIMESTER: Status: ACTIVE | Noted: 2021-12-23

## 2021-12-23 LAB
ALBUMIN SERPL-MCNC: 2.9 G/DL (ref 3.5–5.2)
ALBUMIN/GLOB SERPL: 0.9 G/DL
ALP SERPL-CCNC: 239 U/L (ref 39–117)
ALT SERPL W P-5'-P-CCNC: 11 U/L (ref 1–33)
ANION GAP SERPL CALCULATED.3IONS-SCNC: 10.2 MMOL/L (ref 5–15)
AST SERPL-CCNC: 12 U/L (ref 1–32)
BILIRUB SERPL-MCNC: 0.2 MG/DL (ref 0–1.2)
BUN SERPL-MCNC: 7 MG/DL (ref 6–20)
BUN/CREAT SERPL: 12.3 (ref 7–25)
CALCIUM SPEC-SCNC: 9.4 MG/DL (ref 8.6–10.5)
CHLORIDE SERPL-SCNC: 104 MMOL/L (ref 98–107)
CO2 SERPL-SCNC: 20.8 MMOL/L (ref 22–29)
CREAT SERPL-MCNC: 0.57 MG/DL (ref 0.57–1)
CREAT UR-MCNC: 55.3 MG/DL
DEPRECATED RDW RBC AUTO: 48.6 FL (ref 37–54)
ERYTHROCYTE [DISTWIDTH] IN BLOOD BY AUTOMATED COUNT: 17.3 % (ref 12.3–15.4)
GFR SERPL CREATININE-BSD FRML MDRD: 130 ML/MIN/1.73
GLOBULIN UR ELPH-MCNC: 3.2 GM/DL
GLUCOSE SERPL-MCNC: 96 MG/DL (ref 65–99)
GLUCOSE UR STRIP-MCNC: NEGATIVE MG/DL
HCT VFR BLD AUTO: 30.5 % (ref 34–46.6)
HGB BLD-MCNC: 9.4 G/DL (ref 12–15.9)
MCH RBC QN AUTO: 23.8 PG (ref 26.6–33)
MCHC RBC AUTO-ENTMCNC: 30.8 G/DL (ref 31.5–35.7)
MCV RBC AUTO: 77.2 FL (ref 79–97)
PLATELET # BLD AUTO: 362 10*3/MM3 (ref 140–450)
PMV BLD AUTO: 10.3 FL (ref 6–12)
POTASSIUM SERPL-SCNC: 4.1 MMOL/L (ref 3.5–5.2)
PROT ?TM UR-MCNC: 37.5 MG/DL
PROT SERPL-MCNC: 6.1 G/DL (ref 6–8.5)
PROT UR STRIP-MCNC: NEGATIVE MG/DL
PROT/CREAT UR: 0.7 MG/G{CREAT}
RBC # BLD AUTO: 3.95 10*6/MM3 (ref 3.77–5.28)
SODIUM SERPL-SCNC: 135 MMOL/L (ref 136–145)
WBC NRBC COR # BLD: 12.65 10*3/MM3 (ref 3.4–10.8)

## 2021-12-23 PROCEDURE — 82570 ASSAY OF URINE CREATININE: CPT | Performed by: STUDENT IN AN ORGANIZED HEALTH CARE EDUCATION/TRAINING PROGRAM

## 2021-12-23 PROCEDURE — 59025 FETAL NON-STRESS TEST: CPT

## 2021-12-23 PROCEDURE — 85027 COMPLETE CBC AUTOMATED: CPT | Performed by: STUDENT IN AN ORGANIZED HEALTH CARE EDUCATION/TRAINING PROGRAM

## 2021-12-23 PROCEDURE — 81050 URINALYSIS VOLUME MEASURE: CPT | Performed by: STUDENT IN AN ORGANIZED HEALTH CARE EDUCATION/TRAINING PROGRAM

## 2021-12-23 PROCEDURE — 84156 ASSAY OF PROTEIN URINE: CPT | Performed by: STUDENT IN AN ORGANIZED HEALTH CARE EDUCATION/TRAINING PROGRAM

## 2021-12-23 PROCEDURE — 59025 FETAL NON-STRESS TEST: CPT | Performed by: OBSTETRICS & GYNECOLOGY

## 2021-12-23 PROCEDURE — 99214 OFFICE O/P EST MOD 30 MIN: CPT | Performed by: STUDENT IN AN ORGANIZED HEALTH CARE EDUCATION/TRAINING PROGRAM

## 2021-12-23 PROCEDURE — 80053 COMPREHEN METABOLIC PANEL: CPT | Performed by: STUDENT IN AN ORGANIZED HEALTH CARE EDUCATION/TRAINING PROGRAM

## 2021-12-23 PROCEDURE — G0463 HOSPITAL OUTPT CLINIC VISIT: HCPCS
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
## 2021-12-25 LAB
COLLECT DURATION TIME UR: 24 HRS
PROT 24H UR-MRATE: 850.7 MG/24HOURS (ref 0–150)
SPECIMEN VOL 24H UR: 2650 ML

## 2021-12-26 PROBLEM — O14.00 ANTEPARTUM MILD PREECLAMPSIA: Status: ACTIVE | Noted: 2021-12-26

## 2021-12-27 ENCOUNTER — TELEPHONE (OUTPATIENT)
Dept: OBSTETRICS AND GYNECOLOGY | Facility: CLINIC | Age: 26
End: 2021-12-27

## 2021-12-27 NOTE — TELEPHONE ENCOUNTER
----- Message from Jaguar Dumont MD sent at 12/26/2021  3:26 AM EST -----  Patient rules in for mild preeclampsia; please provide warning signs including: elevated BP >160 systolic or >110 diastolic, HA unrelieved with tylenol, visual disturbances, new onset nausea and vomiting with right upper quandrant pain under breast and severe new onset lower extremitiy swelling bilaterally. If any apparent patient needs to be seen in triage

## 2021-12-27 NOTE — TELEPHONE ENCOUNTER
Discussed results with pt. Adv they are consistent with preeclampsia. Let her know what all she needs to be looking out for per  Adv if she sees any of the symptoms she will want to go to L&D. Pt V/U.

## 2021-12-30 ENCOUNTER — ROUTINE PRENATAL (OUTPATIENT)
Dept: OBSTETRICS AND GYNECOLOGY | Facility: CLINIC | Age: 26
End: 2021-12-30

## 2021-12-30 VITALS — BODY MASS INDEX: 53.22 KG/M2 | WEIGHT: 291 LBS | DIASTOLIC BLOOD PRESSURE: 86 MMHG | SYSTOLIC BLOOD PRESSURE: 142 MMHG

## 2021-12-30 DIAGNOSIS — O09.32 SUPERVISION OF PREGNANCY WITH INSUFFICIENT ANTENATAL CARE IN SECOND TRIMESTER: Primary | ICD-10-CM

## 2021-12-30 DIAGNOSIS — O09.299 HX OF PREECLAMPSIA, PRIOR PREGNANCY, CURRENTLY PREGNANT: ICD-10-CM

## 2021-12-30 DIAGNOSIS — O13.3 GESTATIONAL HYPERTENSION, THIRD TRIMESTER: ICD-10-CM

## 2021-12-30 DIAGNOSIS — U07.1 COVID-19: ICD-10-CM

## 2021-12-30 DIAGNOSIS — Z98.891 PREVIOUS CESAREAN SECTION: ICD-10-CM

## 2021-12-30 DIAGNOSIS — O14.00 ANTEPARTUM MILD PREECLAMPSIA: ICD-10-CM

## 2021-12-30 DIAGNOSIS — E66.01 MORBID OBESITY WITH BMI OF 45.0-49.9, ADULT (HCC): ICD-10-CM

## 2021-12-30 DIAGNOSIS — O99.019 MATERNAL ANEMIA IN PREGNANCY, ANTEPARTUM: ICD-10-CM

## 2021-12-30 DIAGNOSIS — J45.20 MILD INTERMITTENT ASTHMA, UNSPECIFIED WHETHER COMPLICATED: ICD-10-CM

## 2021-12-30 DIAGNOSIS — F17.200 SMOKER: ICD-10-CM

## 2021-12-30 DIAGNOSIS — Z34.80 SUPERVISION OF OTHER NORMAL PREGNANCY: ICD-10-CM

## 2021-12-30 LAB
ALBUMIN SERPL-MCNC: 2.9 G/DL (ref 3.5–5.2)
ALBUMIN/GLOB SERPL: 1.2 G/DL
ALP SERPL-CCNC: 248 U/L (ref 39–117)
ALT SERPL W P-5'-P-CCNC: 10 U/L (ref 1–33)
ANION GAP SERPL CALCULATED.3IONS-SCNC: 12.7 MMOL/L (ref 5–15)
AST SERPL-CCNC: 13 U/L (ref 1–32)
BILIRUB SERPL-MCNC: <0.2 MG/DL (ref 0–1.2)
BUN SERPL-MCNC: 8 MG/DL (ref 6–20)
BUN/CREAT SERPL: 12.9 (ref 7–25)
CALCIUM SPEC-SCNC: 9 MG/DL (ref 8.6–10.5)
CHLORIDE SERPL-SCNC: 106 MMOL/L (ref 98–107)
CO2 SERPL-SCNC: 20.3 MMOL/L (ref 22–29)
CREAT SERPL-MCNC: 0.62 MG/DL (ref 0.57–1)
DEPRECATED RDW RBC AUTO: 47.8 FL (ref 37–54)
ERYTHROCYTE [DISTWIDTH] IN BLOOD BY AUTOMATED COUNT: 17.7 % (ref 12.3–15.4)
GFR SERPL CREATININE-BSD FRML MDRD: 118 ML/MIN/1.73
GLOBULIN UR ELPH-MCNC: 2.4 GM/DL
GLUCOSE SERPL-MCNC: 127 MG/DL (ref 65–99)
GLUCOSE UR STRIP-MCNC: NEGATIVE MG/DL
HCT VFR BLD AUTO: 30.8 % (ref 34–46.6)
HGB BLD-MCNC: 9.4 G/DL (ref 12–15.9)
MCH RBC QN AUTO: 23.6 PG (ref 26.6–33)
MCHC RBC AUTO-ENTMCNC: 30.5 G/DL (ref 31.5–35.7)
MCV RBC AUTO: 77.4 FL (ref 79–97)
PLATELET # BLD AUTO: 368 10*3/MM3 (ref 140–450)
PMV BLD AUTO: 11.6 FL (ref 6–12)
POTASSIUM SERPL-SCNC: 4.4 MMOL/L (ref 3.5–5.2)
PROT SERPL-MCNC: 5.3 G/DL (ref 6–8.5)
PROT UR STRIP-MCNC: ABNORMAL MG/DL
RBC # BLD AUTO: 3.98 10*6/MM3 (ref 3.77–5.28)
SODIUM SERPL-SCNC: 139 MMOL/L (ref 136–145)
WBC NRBC COR # BLD: 13.08 10*3/MM3 (ref 3.4–10.8)

## 2021-12-30 PROCEDURE — 99214 OFFICE O/P EST MOD 30 MIN: CPT | Performed by: STUDENT IN AN ORGANIZED HEALTH CARE EDUCATION/TRAINING PROGRAM

## 2021-12-30 PROCEDURE — 87081 CULTURE SCREEN ONLY: CPT | Performed by: STUDENT IN AN ORGANIZED HEALTH CARE EDUCATION/TRAINING PROGRAM

## 2021-12-30 PROCEDURE — 80053 COMPREHEN METABOLIC PANEL: CPT | Performed by: STUDENT IN AN ORGANIZED HEALTH CARE EDUCATION/TRAINING PROGRAM

## 2021-12-30 PROCEDURE — 85027 COMPLETE CBC AUTOMATED: CPT | Performed by: STUDENT IN AN ORGANIZED HEALTH CARE EDUCATION/TRAINING PROGRAM

## 2021-12-30 NOTE — PROGRESS NOTES
OB FOLLOW UP  Complaint   Chief Complaint   Patient presents with   • Routine Prenatal Visit            Lovely Daniels is a 26 y.o.  35w5d patient being seen today for her obstetrical follow up visit. Patient denies decreased fetal movement, contractions, loss of fluid or vaginal bleeding.  Patient denies any headache, visual disturbances, new onset nausea vomiting, right upper quadrant pain, or new onset swelling.  Has been monitoring blood pressures at home denies any values greater than 160 or greater than 110 for the diastolic.  Denies any shortness of breath or chest pain.      Her prenatal care is complicated by (and status) :    Patient Active Problem List   Diagnosis   • Morbid obesity with BMI of 45.0-49.9, adult ([facility])   • Hx of preeclampsia, prior pregnancy, currently pregnant   • Previous  section   • COVID-19   • Asthma   • Smoker   • Supervision of pregnancy with insufficient  care in second trimester   • Maternal anemia in pregnancy, antepartum   • Antepartum mild preeclampsia       All other systems reviewed and are negative.     The additional following portions of the patient's history were reviewed and updated as appropriate: allergies, current medications, past family history, past medical history, past social history, past surgical history and problem list.      EXAM:     Vital signs: /86   Wt 132 kg (291 lb)   LMP 2021 (Approximate)   Breastfeeding Unknown   BMI 53.22 kg/m²   Appearance/psychiatric: To be in no distress  Constitutional: The patient is well nourished.  Cardiovascular: She does not have edema.  Respiratory: Respiratory effort is normal.  Gastrointestinal: Abdomen is soft, gravid, nontender, no rashes, heart tones are present, fundal height is size equals dates    Pelvic Exam: No    Urine glucose/protein: See prenatal flowsheet       Assessment and Plan    Problem List Items Addressed This Visit        Endocrine and Metabolic    Morbid obesity  with BMI of 45.0-49.9, adult ([facility])    Overview     10/29/2021 question of exercise in pregnancy.  Recommendation of 30 minutes x 5 days a week            Gravid and     Supervision of pregnancy with insufficient  care in second trimester - Primary    Overview     Initial visit:  • PNL: wnl   • PAP/GC/CT/Urine culture:  • Blood type: A+/neg   • Hx of HSV: no     Genetic testing:    •     Vaccinations:  • COVID: Recommendation made 10/29/2021   • Influenza: Recommendation made 10/29/2021     24-28 weeks:  10/29/2021  • 1hr GCT:  • Hct/Plt:  • Tdap Rx -given 10/29/2021   • Rhogam indicated:  No     Third Trimester:  • GBS  • Breast pump:  • Contraception: desires tubal ligation     Ultrasound:  • Dating:   • Anatomy:   • Follow up:   o 10/29/2021  (45) AMY 15.6 breech anterior fundal placenta.  Profile left ventricular outflow tract and four-chamber heart within normal limits.  Unable to visualize right ventricular outflow tract.  Repeat ultrasound in 4 weeks for RVOT  o 2021 -EFW 1155 (52.8) RVOT appears within normal limits    PLAN:               Previous  section    Overview     10/29/2021 Desires repeat  section; would like had tubes tied.  Needs to sign paperwork         Hx of preeclampsia, prior pregnancy, currently pregnant    Overview     10/29/2021 compliant with aspirin.  Second elevation in blood pressure today 10/29/2021 patient asymptomatic.  Will get baseline labs and firms of CBC and CMP and UPCR.  If elevated UPCR consider doing 24-hour urine collection.    21 - has not collected 24-urine to date, encouraged to collect and submit         RESOLVED: Gestational hypertension, third trimester    Antepartum mild preeclampsia    Overview     2021 rules in 2021         Relevant Orders    CBC (No Diff)    Comprehensive Metabolic Panel       Hematology and Neoplasia    Maternal anemia in pregnancy, antepartum    Overview     2021 H&H  essentially unchanged from 1 month ago.  Reports compliance with iron at last visit.  Recheck CBC in 1 month if CBC remains low recommendation for iron infusion.         Relevant Medications    ferrous sulfate 325 (65 FE) MG tablet       Pulmonary and Pneumonias    Asthma       Tobacco    Smoker    Overview     10/29/2021 3 a day. Recommend cessation.              Other    COVID-19    Overview     10/29/2021 history Covid in August.  Recommendation for Covid vaccination as well as influenza vaccination           Other Visit Diagnoses     Supervision of other normal pregnancy        Relevant Orders    POC Urinalysis Dipstick (Completed)    Group B Streptococcus Culture - Swab, Vaginal/Rectum          Impression  1. Pregnancy at 35w5d  2. Fetal status reassuring.   3. Activity and Exercise discussed.    Plan  1.  Patient currently with mild preeclampsia with 24-hour urine collection resulting 800+ milligrams.  Will repeat CBC and CMP today for end organ damage.  Strict preeclampsia precautions discussed with patient.  2.  Her  with bilateral tubal ligation will be moved to 37 weeks.  She will need to have NST weekly.  3.  GBS collected today      Patient was counseled to the following pregnancy precautions:  • Decreased fetal movement, if concern for decreased fetal movement please perform fetal kick counts you are looking for 10 movements in 2 hours.  If concern for fetal movement and not meeting that criteria, please present to triage for evaluation.  • Contractions occurring every 5 minutes for over an hour, lasting 30 to 60 seconds and progressively causing more discomfort, please seek medical attention to rule out labor  • If you believe that your water is broken, place a sanitary pad.  If pad fills in short period of time i.e. less than 5 minutes, take off pad placed another pad.  If this is saturated please present for rule out rupture of membranes  • Vaginal bleeding can be normal in pregnancy, this  usually takes a form of spotting.  If having heavier bleeding like a menstrual period please present for evaluation; especially in light of severe abdominal pain this could represent a placental abruption.  • Keep all scheduled appointments as recommended.        Jaguar Dumont MD  12/30/2021

## 2022-01-01 LAB — BACTERIA SPEC AEROBE CULT: NORMAL

## 2022-01-03 ENCOUNTER — HOSPITAL ENCOUNTER (OUTPATIENT)
Facility: HOSPITAL | Age: 27
Discharge: HOME | End: 2022-01-03
Attending: STUDENT IN AN ORGANIZED HEALTH CARE EDUCATION/TRAINING PROGRAM | Admitting: STUDENT IN AN ORGANIZED HEALTH CARE EDUCATION/TRAINING PROGRAM

## 2022-01-03 VITALS
HEART RATE: 114 BPM | BODY MASS INDEX: 53.73 KG/M2 | OXYGEN SATURATION: 100 % | WEIGHT: 292 LBS | TEMPERATURE: 98.4 F | RESPIRATION RATE: 22 BRPM | HEIGHT: 62 IN | DIASTOLIC BLOOD PRESSURE: 84 MMHG | SYSTOLIC BLOOD PRESSURE: 136 MMHG

## 2022-01-03 PROCEDURE — G0463 HOSPITAL OUTPT CLINIC VISIT: HCPCS

## 2022-01-03 PROCEDURE — 59025 FETAL NON-STRESS TEST: CPT

## 2022-01-03 NOTE — SIGNIFICANT NOTE
"36w2d    /84   Pulse 114   Temp 98.4 °F (36.9 °C) (Oral)   Resp 22   Ht 157.5 cm (62\")   Wt 132 kg (292 lb)   LMP 04/24/2021 (Approximate)   SpO2 100%   BMI 53.41 kg/m²   Reason for test: (P) Hypertension  Date of Test: 1/3/2022  Time frame of test: 6730-8796  RN NST Interpretation: (P) Reactive     01/03/22 1101   Nonstress Test   Reason for NST Hypertension   Acoustic Stimulator No   Uterine Irritability No   Contractions Not present   Fetal Assessment   Fetal Movement active   Fetal HR Assessment Method external   Fetal HR (beats/min) 130   Fetal Heart Baseline Rate normal range   Fetal HR Variability moderate (amplitude range 6 to 25 bpm)   Fetal HR Accelerations greater than/equal to 15 bpm; lasting at least 15 seconds   Fetal HR Decelerations absent   Fetal HR Tracing Category Category I   Sinusoidal Pattern Present absent   Interpretation A   Nonstress Test Interpretation A Reactive     "

## 2022-01-04 ENCOUNTER — LAB (OUTPATIENT)
Dept: LAB | Facility: HOSPITAL | Age: 27
End: 2022-01-04

## 2022-01-04 ENCOUNTER — TRANSCRIBE ORDERS (OUTPATIENT)
Dept: LAB | Facility: HOSPITAL | Age: 27
End: 2022-01-04

## 2022-01-04 DIAGNOSIS — Z01.818 PREOP TESTING: Primary | ICD-10-CM

## 2022-01-04 DIAGNOSIS — Z01.818 PREOP TESTING: ICD-10-CM

## 2022-01-04 PROCEDURE — U0004 COV-19 TEST NON-CDC HGH THRU: HCPCS

## 2022-01-04 PROCEDURE — C9803 HOPD COVID-19 SPEC COLLECT: HCPCS

## 2022-01-05 LAB — SARS-COV-2 RNA PNL SPEC NAA+PROBE: NOT DETECTED

## 2022-01-08 ENCOUNTER — HOSPITAL ENCOUNTER (INPATIENT)
Facility: HOSPITAL | Age: 27
LOS: 2 days | Discharge: HOME | End: 2022-01-10
Attending: STUDENT IN AN ORGANIZED HEALTH CARE EDUCATION/TRAINING PROGRAM | Admitting: STUDENT IN AN ORGANIZED HEALTH CARE EDUCATION/TRAINING PROGRAM
Payer: COMMERCIAL

## 2022-01-08 ENCOUNTER — ANESTHESIA (OUTPATIENT)
Dept: LABOR AND DELIVERY | Facility: HOSPITAL | Age: 27
End: 2022-01-08

## 2022-01-08 ENCOUNTER — ANESTHESIA EVENT (OUTPATIENT)
Dept: LABOR AND DELIVERY | Facility: HOSPITAL | Age: 27
End: 2022-01-08

## 2022-01-08 PROBLEM — Z98.891 H/O: C-SECTION: Status: ACTIVE | Noted: 2022-01-08

## 2022-01-08 LAB
ABO GROUP BLD: NORMAL
ALBUMIN SERPL-MCNC: 3.1 G/DL (ref 3.5–5.2)
ALBUMIN/GLOB SERPL: 0.9 G/DL
ALP SERPL-CCNC: 315 U/L (ref 39–117)
ALT SERPL W P-5'-P-CCNC: 9 U/L (ref 1–33)
AMPHET+METHAMPHET UR QL: NEGATIVE
ANION GAP SERPL CALCULATED.3IONS-SCNC: 11.8 MMOL/L (ref 5–15)
AST SERPL-CCNC: 14 U/L (ref 1–32)
BARBITURATES UR QL SCN: NEGATIVE
BENZODIAZ UR QL SCN: NEGATIVE
BILIRUB SERPL-MCNC: 0.3 MG/DL (ref 0–1.2)
BLD GP AB SCN SERPL QL: NEGATIVE
BUN SERPL-MCNC: 7 MG/DL (ref 6–20)
BUN/CREAT SERPL: 12.5 (ref 7–25)
CALCIUM SPEC-SCNC: 9.4 MG/DL (ref 8.6–10.5)
CANNABINOIDS SERPL QL: NEGATIVE
CHLORIDE SERPL-SCNC: 102 MMOL/L (ref 98–107)
CO2 SERPL-SCNC: 20.2 MMOL/L (ref 22–29)
COCAINE UR QL: NEGATIVE
CREAT SERPL-MCNC: 0.56 MG/DL (ref 0.57–1)
DEPRECATED RDW RBC AUTO: 53.6 FL (ref 37–54)
ERYTHROCYTE [DISTWIDTH] IN BLOOD BY AUTOMATED COUNT: 19.4 % (ref 12.3–15.4)
GFR SERPL CREATININE-BSD FRML MDRD: 133 ML/MIN/1.73
GLOBULIN UR ELPH-MCNC: 3.5 GM/DL
GLUCOSE SERPL-MCNC: 94 MG/DL (ref 65–99)
HCT VFR BLD AUTO: 32 % (ref 34–46.6)
HGB BLD-MCNC: 9.7 G/DL (ref 12–15.9)
MCH RBC QN AUTO: 23.8 PG (ref 26.6–33)
MCHC RBC AUTO-ENTMCNC: 30.3 G/DL (ref 31.5–35.7)
MCV RBC AUTO: 78.6 FL (ref 79–97)
METHADONE UR QL SCN: NEGATIVE
OPIATES UR QL: NEGATIVE
OXYCODONE UR QL SCN: NEGATIVE
PLATELET # BLD AUTO: 392 10*3/MM3 (ref 140–450)
PMV BLD AUTO: 10.4 FL (ref 6–12)
POTASSIUM SERPL-SCNC: 4.2 MMOL/L (ref 3.5–5.2)
PROT SERPL-MCNC: 6.6 G/DL (ref 6–8.5)
RBC # BLD AUTO: 4.07 10*6/MM3 (ref 3.77–5.28)
RH BLD: POSITIVE
SODIUM SERPL-SCNC: 134 MMOL/L (ref 136–145)
T&S EXPIRATION DATE: NORMAL
WBC NRBC COR # BLD: 15.23 10*3/MM3 (ref 3.4–10.8)

## 2022-01-08 PROCEDURE — 88305 TISSUE EXAM BY PATHOLOGIST: CPT | Performed by: STUDENT IN AN ORGANIZED HEALTH CARE EDUCATION/TRAINING PROGRAM

## 2022-01-08 PROCEDURE — S0260 H&P FOR SURGERY: HCPCS | Performed by: STUDENT IN AN ORGANIZED HEALTH CARE EDUCATION/TRAINING PROGRAM

## 2022-01-08 PROCEDURE — 85027 COMPLETE CBC AUTOMATED: CPT | Performed by: STUDENT IN AN ORGANIZED HEALTH CARE EDUCATION/TRAINING PROGRAM

## 2022-01-08 PROCEDURE — 59515 CESAREAN DELIVERY: CPT | Performed by: STUDENT IN AN ORGANIZED HEALTH CARE EDUCATION/TRAINING PROGRAM

## 2022-01-08 PROCEDURE — 25010000002 KETOROLAC TROMETHAMINE PER 15 MG: Performed by: ANESTHESIOLOGY

## 2022-01-08 PROCEDURE — 86901 BLOOD TYPING SEROLOGIC RH(D): CPT | Performed by: STUDENT IN AN ORGANIZED HEALTH CARE EDUCATION/TRAINING PROGRAM

## 2022-01-08 PROCEDURE — 80307 DRUG TEST PRSMV CHEM ANLYZR: CPT | Performed by: STUDENT IN AN ORGANIZED HEALTH CARE EDUCATION/TRAINING PROGRAM

## 2022-01-08 PROCEDURE — 80053 COMPREHEN METABOLIC PANEL: CPT | Performed by: STUDENT IN AN ORGANIZED HEALTH CARE EDUCATION/TRAINING PROGRAM

## 2022-01-08 PROCEDURE — 25010000002 CEFAZOLIN PER 500 MG: Performed by: STUDENT IN AN ORGANIZED HEALTH CARE EDUCATION/TRAINING PROGRAM

## 2022-01-08 PROCEDURE — 58700 REMOVAL OF FALLOPIAN TUBE: CPT | Performed by: STUDENT IN AN ORGANIZED HEALTH CARE EDUCATION/TRAINING PROGRAM

## 2022-01-08 PROCEDURE — 86900 BLOOD TYPING SEROLOGIC ABO: CPT | Performed by: STUDENT IN AN ORGANIZED HEALTH CARE EDUCATION/TRAINING PROGRAM

## 2022-01-08 PROCEDURE — 0 MORPHINE PER 10 MG: Performed by: ANESTHESIOLOGY

## 2022-01-08 PROCEDURE — 0UT70ZZ RESECTION OF BILATERAL FALLOPIAN TUBES, OPEN APPROACH: ICD-10-PCS | Performed by: STUDENT IN AN ORGANIZED HEALTH CARE EDUCATION/TRAINING PROGRAM

## 2022-01-08 PROCEDURE — 86850 RBC ANTIBODY SCREEN: CPT | Performed by: STUDENT IN AN ORGANIZED HEALTH CARE EDUCATION/TRAINING PROGRAM

## 2022-01-08 PROCEDURE — 25010000002 FENTANYL CITRATE (PF) 50 MCG/ML SOLUTION: Performed by: ANESTHESIOLOGY

## 2022-01-08 RX ORDER — ONDANSETRON 2 MG/ML
4 INJECTION INTRAMUSCULAR; INTRAVENOUS EVERY 6 HOURS PRN
Status: ACTIVE | OUTPATIENT
Start: 2022-01-08 | End: 2022-01-09

## 2022-01-08 RX ORDER — HYDROXYZINE HYDROCHLORIDE 25 MG/1
50 TABLET, FILM COATED ORAL NIGHTLY PRN
Status: DISCONTINUED | OUTPATIENT
Start: 2022-01-08 | End: 2022-01-08 | Stop reason: HOSPADM

## 2022-01-08 RX ORDER — BUTORPHANOL TARTRATE 1 MG/ML
2 INJECTION, SOLUTION INTRAMUSCULAR; INTRAVENOUS EVERY 6 HOURS PRN
Status: ACTIVE | OUTPATIENT
Start: 2022-01-08 | End: 2022-01-09

## 2022-01-08 RX ORDER — METOCLOPRAMIDE HYDROCHLORIDE 5 MG/ML
10 INJECTION INTRAMUSCULAR; INTRAVENOUS ONCE AS NEEDED
Status: DISCONTINUED | OUTPATIENT
Start: 2022-01-08 | End: 2022-01-08 | Stop reason: HOSPADM

## 2022-01-08 RX ORDER — NALOXONE HCL 0.4 MG/ML
0.4 VIAL (ML) INJECTION ONCE AS NEEDED
Status: ACTIVE | OUTPATIENT
Start: 2022-01-08 | End: 2022-01-09

## 2022-01-08 RX ORDER — HYDROCODONE BITARTRATE AND ACETAMINOPHEN 5; 325 MG/1; MG/1
1 TABLET ORAL EVERY 4 HOURS PRN
Status: DISCONTINUED | OUTPATIENT
Start: 2022-01-08 | End: 2022-01-08 | Stop reason: HOSPADM

## 2022-01-08 RX ORDER — HYDROXYZINE HYDROCHLORIDE 25 MG/1
50 TABLET, FILM COATED ORAL EVERY 6 HOURS PRN
Status: DISCONTINUED | OUTPATIENT
Start: 2022-01-08 | End: 2022-01-10 | Stop reason: HOSPADM

## 2022-01-08 RX ORDER — IBUPROFEN 800 MG/1
800 TABLET, FILM COATED ORAL EVERY 8 HOURS SCHEDULED
Status: DISCONTINUED | OUTPATIENT
Start: 2022-01-09 | End: 2022-01-10 | Stop reason: HOSPADM

## 2022-01-08 RX ORDER — NICOTINE 21 MG/24HR
1 PATCH, TRANSDERMAL 24 HOURS TRANSDERMAL
Status: DISCONTINUED | OUTPATIENT
Start: 2022-01-08 | End: 2022-01-09

## 2022-01-08 RX ORDER — CEFAZOLIN SODIUM IN 0.9 % NACL 3 G/100 ML
3 INTRAVENOUS SOLUTION, PIGGYBACK (ML) INTRAVENOUS ONCE
Status: COMPLETED | OUTPATIENT
Start: 2022-01-08 | End: 2022-01-08

## 2022-01-08 RX ORDER — OXYCODONE HYDROCHLORIDE 5 MG/1
5 TABLET ORAL EVERY 4 HOURS PRN
Status: DISCONTINUED | OUTPATIENT
Start: 2022-01-08 | End: 2022-01-10 | Stop reason: HOSPADM

## 2022-01-08 RX ORDER — DIPHENHYDRAMINE HYDROCHLORIDE 50 MG/ML
12.5 INJECTION INTRAMUSCULAR; INTRAVENOUS EVERY 6 HOURS PRN
Status: ACTIVE | OUTPATIENT
Start: 2022-01-08 | End: 2022-01-09

## 2022-01-08 RX ORDER — SODIUM CHLORIDE 0.9 % (FLUSH) 0.9 %
10 SYRINGE (ML) INJECTION AS NEEDED
Status: DISCONTINUED | OUTPATIENT
Start: 2022-01-08 | End: 2022-01-08 | Stop reason: HOSPADM

## 2022-01-08 RX ORDER — SIMETHICONE 80 MG
80 TABLET,CHEWABLE ORAL 4 TIMES DAILY PRN
Status: DISCONTINUED | OUTPATIENT
Start: 2022-01-08 | End: 2022-01-10 | Stop reason: HOSPADM

## 2022-01-08 RX ORDER — SODIUM CHLORIDE 0.9 % (FLUSH) 0.9 %
3 SYRINGE (ML) INJECTION EVERY 12 HOURS SCHEDULED
Status: DISCONTINUED | OUTPATIENT
Start: 2022-01-08 | End: 2022-01-08 | Stop reason: HOSPADM

## 2022-01-08 RX ORDER — CALCIUM CARBONATE 500 MG/1
1 TABLET, CHEWABLE ORAL EVERY 4 HOURS PRN
Status: DISCONTINUED | OUTPATIENT
Start: 2022-01-08 | End: 2022-01-10 | Stop reason: HOSPADM

## 2022-01-08 RX ORDER — ONDANSETRON 2 MG/ML
4 INJECTION INTRAMUSCULAR; INTRAVENOUS EVERY 6 HOURS PRN
Status: DISCONTINUED | OUTPATIENT
Start: 2022-01-08 | End: 2022-01-08 | Stop reason: HOSPADM

## 2022-01-08 RX ORDER — ALBUTEROL SULFATE 90 UG/1
2 INHALANT RESPIRATORY (INHALATION)
Status: DISCONTINUED | OUTPATIENT
Start: 2022-01-08 | End: 2022-01-10 | Stop reason: HOSPADM

## 2022-01-08 RX ORDER — IBUPROFEN 600 MG/1
600 TABLET, FILM COATED ORAL EVERY 6 HOURS PRN
Status: DISCONTINUED | OUTPATIENT
Start: 2022-01-08 | End: 2022-01-08 | Stop reason: HOSPADM

## 2022-01-08 RX ORDER — EPHEDRINE SULFATE 50 MG/ML
INJECTION, SOLUTION INTRAVENOUS AS NEEDED
Status: DISCONTINUED | OUTPATIENT
Start: 2022-01-08 | End: 2022-01-08 | Stop reason: SURG

## 2022-01-08 RX ORDER — FAMOTIDINE 10 MG/ML
20 INJECTION, SOLUTION INTRAVENOUS ONCE AS NEEDED
Status: DISCONTINUED | OUTPATIENT
Start: 2022-01-08 | End: 2022-01-08 | Stop reason: HOSPADM

## 2022-01-08 RX ORDER — CITRIC ACID/SODIUM CITRATE 334-500MG
30 SOLUTION, ORAL ORAL ONCE
Status: COMPLETED | OUTPATIENT
Start: 2022-01-08 | End: 2022-01-08

## 2022-01-08 RX ORDER — ALUMINA, MAGNESIA, AND SIMETHICONE 2400; 2400; 240 MG/30ML; MG/30ML; MG/30ML
15 SUSPENSION ORAL EVERY 4 HOURS PRN
Status: DISCONTINUED | OUTPATIENT
Start: 2022-01-08 | End: 2022-01-10 | Stop reason: HOSPADM

## 2022-01-08 RX ORDER — MISOPROSTOL 200 UG/1
200 TABLET ORAL AS NEEDED
Status: DISCONTINUED | OUTPATIENT
Start: 2022-01-08 | End: 2022-01-10 | Stop reason: HOSPADM

## 2022-01-08 RX ORDER — ACETAMINOPHEN 325 MG/1
975 TABLET ORAL ONCE
Status: COMPLETED | OUTPATIENT
Start: 2022-01-08 | End: 2022-01-08

## 2022-01-08 RX ORDER — ONDANSETRON 4 MG/1
4 TABLET, FILM COATED ORAL EVERY 6 HOURS PRN
Status: DISCONTINUED | OUTPATIENT
Start: 2022-01-08 | End: 2022-01-08 | Stop reason: HOSPADM

## 2022-01-08 RX ORDER — HYDROCODONE BITARTRATE AND ACETAMINOPHEN 5; 325 MG/1; MG/1
1 TABLET ORAL EVERY 6 HOURS PRN
Status: DISPENSED | OUTPATIENT
Start: 2022-01-08 | End: 2022-01-09

## 2022-01-08 RX ORDER — KETOROLAC TROMETHAMINE 30 MG/ML
30 INJECTION, SOLUTION INTRAMUSCULAR; INTRAVENOUS EVERY 6 HOURS
Status: DISCONTINUED | OUTPATIENT
Start: 2022-01-08 | End: 2022-01-08 | Stop reason: SDUPTHER

## 2022-01-08 RX ORDER — LIDOCAINE HYDROCHLORIDE 10 MG/ML
5 INJECTION, SOLUTION EPIDURAL; INFILTRATION; INTRACAUDAL; PERINEURAL AS NEEDED
Status: DISCONTINUED | OUTPATIENT
Start: 2022-01-08 | End: 2022-01-08 | Stop reason: HOSPADM

## 2022-01-08 RX ORDER — ACETAMINOPHEN 325 MG/1
650 TABLET ORAL EVERY 4 HOURS PRN
Status: DISCONTINUED | OUTPATIENT
Start: 2022-01-08 | End: 2022-01-08 | Stop reason: HOSPADM

## 2022-01-08 RX ORDER — HYDROXYZINE HYDROCHLORIDE 25 MG/1
50 TABLET, FILM COATED ORAL NIGHTLY PRN
Status: DISCONTINUED | OUTPATIENT
Start: 2022-01-08 | End: 2022-01-10 | Stop reason: HOSPADM

## 2022-01-08 RX ORDER — FENTANYL CITRATE 50 UG/ML
INJECTION, SOLUTION INTRAMUSCULAR; INTRAVENOUS
Status: COMPLETED | OUTPATIENT
Start: 2022-01-08 | End: 2022-01-08

## 2022-01-08 RX ORDER — CARBOPROST TROMETHAMINE 250 UG/ML
250 INJECTION, SOLUTION INTRAMUSCULAR AS NEEDED
Status: DISCONTINUED | OUTPATIENT
Start: 2022-01-08 | End: 2022-01-10 | Stop reason: HOSPADM

## 2022-01-08 RX ORDER — CARBOPROST TROMETHAMINE 250 UG/ML
250 INJECTION, SOLUTION INTRAMUSCULAR AS NEEDED
Status: DISCONTINUED | OUTPATIENT
Start: 2022-01-08 | End: 2022-01-08 | Stop reason: HOSPADM

## 2022-01-08 RX ORDER — SODIUM CHLORIDE 0.9 % (FLUSH) 0.9 %
3-10 SYRINGE (ML) INJECTION AS NEEDED
Status: DISCONTINUED | OUTPATIENT
Start: 2022-01-08 | End: 2022-01-10 | Stop reason: HOSPADM

## 2022-01-08 RX ORDER — ACETAMINOPHEN 500 MG
1000 TABLET ORAL EVERY 6 HOURS
Status: DISCONTINUED | OUTPATIENT
Start: 2022-01-08 | End: 2022-01-10 | Stop reason: HOSPADM

## 2022-01-08 RX ORDER — SODIUM CHLORIDE 0.9 % (FLUSH) 0.9 %
3 SYRINGE (ML) INJECTION EVERY 12 HOURS SCHEDULED
Status: DISCONTINUED | OUTPATIENT
Start: 2022-01-08 | End: 2022-01-10 | Stop reason: HOSPADM

## 2022-01-08 RX ORDER — METHYLERGONOVINE MALEATE 0.2 MG/ML
200 INJECTION INTRAVENOUS ONCE AS NEEDED
Status: DISCONTINUED | OUTPATIENT
Start: 2022-01-08 | End: 2022-01-08 | Stop reason: HOSPADM

## 2022-01-08 RX ORDER — SODIUM CHLORIDE, SODIUM LACTATE, POTASSIUM CHLORIDE, CALCIUM CHLORIDE 600; 310; 30; 20 MG/100ML; MG/100ML; MG/100ML; MG/100ML
125 INJECTION, SOLUTION INTRAVENOUS CONTINUOUS
Status: DISCONTINUED | OUTPATIENT
Start: 2022-01-08 | End: 2022-01-10 | Stop reason: HOSPADM

## 2022-01-08 RX ORDER — MISOPROSTOL 200 UG/1
800 TABLET ORAL AS NEEDED
Status: DISCONTINUED | OUTPATIENT
Start: 2022-01-08 | End: 2022-01-08 | Stop reason: HOSPADM

## 2022-01-08 RX ORDER — FAMOTIDINE 20 MG/1
20 TABLET, FILM COATED ORAL ONCE AS NEEDED
Status: DISCONTINUED | OUTPATIENT
Start: 2022-01-08 | End: 2022-01-08 | Stop reason: HOSPADM

## 2022-01-08 RX ORDER — OXYTOCIN 10 [USP'U]/ML
INJECTION, SOLUTION INTRAMUSCULAR; INTRAVENOUS AS NEEDED
Status: DISCONTINUED | OUTPATIENT
Start: 2022-01-08 | End: 2022-01-08 | Stop reason: SURG

## 2022-01-08 RX ORDER — DIPHENHYDRAMINE HCL 25 MG
25 CAPSULE ORAL EVERY 6 HOURS PRN
Status: ACTIVE | OUTPATIENT
Start: 2022-01-08 | End: 2022-01-09

## 2022-01-08 RX ORDER — HYDROCORTISONE 25 MG/G
CREAM TOPICAL 3 TIMES DAILY PRN
Status: DISCONTINUED | OUTPATIENT
Start: 2022-01-08 | End: 2022-01-10 | Stop reason: HOSPADM

## 2022-01-08 RX ORDER — TRANEXAMIC ACID 10 MG/ML
1000 INJECTION, SOLUTION INTRAVENOUS ONCE
Status: COMPLETED | OUTPATIENT
Start: 2022-01-08 | End: 2022-01-08

## 2022-01-08 RX ORDER — BUPIVACAINE HYDROCHLORIDE 7.5 MG/ML
INJECTION, SOLUTION EPIDURAL; RETROBULBAR
Status: COMPLETED | OUTPATIENT
Start: 2022-01-08 | End: 2022-01-08

## 2022-01-08 RX ORDER — DOCUSATE SODIUM 100 MG/1
100 CAPSULE, LIQUID FILLED ORAL DAILY
Status: DISCONTINUED | OUTPATIENT
Start: 2022-01-08 | End: 2022-01-10 | Stop reason: HOSPADM

## 2022-01-08 RX ORDER — ONDANSETRON 2 MG/ML
4 INJECTION INTRAMUSCULAR; INTRAVENOUS EVERY 6 HOURS PRN
Status: DISCONTINUED | OUTPATIENT
Start: 2022-01-08 | End: 2022-01-10 | Stop reason: HOSPADM

## 2022-01-08 RX ORDER — MORPHINE SULFATE 0.5 MG/ML
INJECTION, SOLUTION EPIDURAL; INTRATHECAL; INTRAVENOUS
Status: COMPLETED | OUTPATIENT
Start: 2022-01-08 | End: 2022-01-08

## 2022-01-08 RX ORDER — TRANEXAMIC ACID 10 MG/ML
1000 INJECTION, SOLUTION INTRAVENOUS ONCE AS NEEDED
Status: DISCONTINUED | OUTPATIENT
Start: 2022-01-08 | End: 2022-01-08 | Stop reason: HOSPADM

## 2022-01-08 RX ORDER — ALBUTEROL SULFATE 90 UG/1
2 INHALANT RESPIRATORY (INHALATION) EVERY 4 HOURS PRN
Status: DISCONTINUED | OUTPATIENT
Start: 2022-01-08 | End: 2022-01-08 | Stop reason: HOSPADM

## 2022-01-08 RX ORDER — ALBUTEROL SULFATE 90 UG/1
2 INHALANT RESPIRATORY (INHALATION) EVERY 4 HOURS PRN
COMMUNITY
End: 2022-10-25

## 2022-01-08 RX ORDER — KETOROLAC TROMETHAMINE 30 MG/ML
30 INJECTION, SOLUTION INTRAMUSCULAR; INTRAVENOUS EVERY 6 HOURS
Status: DISPENSED | OUTPATIENT
Start: 2022-01-08 | End: 2022-01-09

## 2022-01-08 RX ORDER — FERROUS SULFATE 325(65) MG
325 TABLET ORAL EVERY OTHER DAY
Status: DISCONTINUED | OUTPATIENT
Start: 2022-01-08 | End: 2022-01-10 | Stop reason: HOSPADM

## 2022-01-08 RX ORDER — PHENYLEPHRINE HCL IN 0.9% NACL 1 MG/10 ML
SYRINGE (ML) INTRAVENOUS AS NEEDED
Status: DISCONTINUED | OUTPATIENT
Start: 2022-01-08 | End: 2022-01-08 | Stop reason: SURG

## 2022-01-08 RX ORDER — OXYTOCIN-SODIUM CHLORIDE 0.9% IV SOLN 30 UNIT/500ML 30-0.9/5 UT/ML-%
125 SOLUTION INTRAVENOUS ONCE
Status: DISCONTINUED | OUTPATIENT
Start: 2022-01-08 | End: 2022-01-08 | Stop reason: HOSPADM

## 2022-01-08 RX ORDER — ONDANSETRON 4 MG/1
4 TABLET, FILM COATED ORAL EVERY 6 HOURS PRN
Status: DISCONTINUED | OUTPATIENT
Start: 2022-01-08 | End: 2022-01-10 | Stop reason: HOSPADM

## 2022-01-08 RX ORDER — ALBUTEROL SULFATE 0.83 MG/ML
2.5 SOLUTION RESPIRATORY (INHALATION) EVERY 6 HOURS PRN
Status: DISCONTINUED | OUTPATIENT
Start: 2022-01-08 | End: 2022-01-08

## 2022-01-08 RX ORDER — PRENATAL VIT/IRON FUM/FOLIC AC 27MG-0.8MG
1 TABLET ORAL DAILY
Status: DISCONTINUED | OUTPATIENT
Start: 2022-01-08 | End: 2022-01-10 | Stop reason: HOSPADM

## 2022-01-08 RX ADMIN — EPHEDRINE SULFATE 10 MG: 50 INJECTION INTRAVENOUS at 11:40

## 2022-01-08 RX ADMIN — FENTANYL CITRATE 85 MCG: 50 INJECTION, SOLUTION INTRAMUSCULAR; INTRAVENOUS at 11:14

## 2022-01-08 RX ADMIN — ACETAMINOPHEN 1000 MG: 500 TABLET ORAL at 16:15

## 2022-01-08 RX ADMIN — VITAMIN A, VITAMIN C, VITAMIN D, VITAMIN E, THIAMINE, RIBOFLAVIN, NIACIN, VITAMIN B6, FOLIC ACID, VITAMIN B12, CALCIUM, IRON, ZINC, COPPER 1 TABLET: 4000; 120; 400; 22; 1.84; 3; 20; 10; 1; 12; 200; 27; 25; 2 TABLET ORAL at 16:14

## 2022-01-08 RX ADMIN — Medication 200 MCG: at 11:55

## 2022-01-08 RX ADMIN — SODIUM CHLORIDE, POTASSIUM CHLORIDE, SODIUM LACTATE AND CALCIUM CHLORIDE 1000 ML: 600; 310; 30; 20 INJECTION, SOLUTION INTRAVENOUS at 10:08

## 2022-01-08 RX ADMIN — FENTANYL CITRATE 15 MCG: 50 INJECTION, SOLUTION INTRAMUSCULAR; INTRAVENOUS at 11:32

## 2022-01-08 RX ADMIN — OXYTOCIN 20 UNITS: 10 INJECTION, SOLUTION INTRAMUSCULAR; INTRAVENOUS at 11:53

## 2022-01-08 RX ADMIN — EPHEDRINE SULFATE 10 MG: 50 INJECTION INTRAVENOUS at 11:35

## 2022-01-08 RX ADMIN — MORPHINE SULFATE 2 MG: 0.5 INJECTION, SOLUTION EPIDURAL; INTRATHECAL; INTRAVENOUS at 12:03

## 2022-01-08 RX ADMIN — ACETAMINOPHEN 1000 MG: 500 TABLET ORAL at 22:22

## 2022-01-08 RX ADMIN — ACETAMINOPHEN 975 MG: 325 TABLET ORAL at 10:15

## 2022-01-08 RX ADMIN — MORPHINE SULFATE 1.9 MG: 0.5 INJECTION, SOLUTION EPIDURAL; INTRATHECAL; INTRAVENOUS at 11:56

## 2022-01-08 RX ADMIN — MORPHINE SULFATE 0.1 MG: 0.5 INJECTION, SOLUTION EPIDURAL; INTRATHECAL; INTRAVENOUS at 11:32

## 2022-01-08 RX ADMIN — MORPHINE SULFATE 3 MG: 0.5 INJECTION, SOLUTION EPIDURAL; INTRATHECAL; INTRAVENOUS at 12:13

## 2022-01-08 RX ADMIN — FERROUS SULFATE TAB 325 MG (65 MG ELEMENTAL FE) 325 MG: 325 (65 FE) TAB at 16:15

## 2022-01-08 RX ADMIN — Medication 200 MCG: at 11:59

## 2022-01-08 RX ADMIN — TRANEXAMIC ACID 1000 MG: 10 INJECTION, SOLUTION INTRAVENOUS at 11:51

## 2022-01-08 RX ADMIN — Medication 200 MCG: at 11:30

## 2022-01-08 RX ADMIN — ALBUTEROL SULFATE 2 PUFF: 90 AEROSOL, METERED RESPIRATORY (INHALATION) at 19:18

## 2022-01-08 RX ADMIN — Medication 200 MCG: at 11:39

## 2022-01-08 RX ADMIN — KETOROLAC TROMETHAMINE 30 MG: 30 INJECTION, SOLUTION INTRAMUSCULAR; INTRAVENOUS at 16:15

## 2022-01-08 RX ADMIN — MORPHINE SULFATE 3 MG: 0.5 INJECTION, SOLUTION EPIDURAL; INTRATHECAL; INTRAVENOUS at 12:08

## 2022-01-08 RX ADMIN — EPHEDRINE SULFATE 10 MG: 50 INJECTION INTRAVENOUS at 12:11

## 2022-01-08 RX ADMIN — Medication 200 MCG: at 12:04

## 2022-01-08 RX ADMIN — SODIUM CHLORIDE, POTASSIUM CHLORIDE, SODIUM LACTATE AND CALCIUM CHLORIDE: 600; 310; 30; 20 INJECTION, SOLUTION INTRAVENOUS at 11:19

## 2022-01-08 RX ADMIN — OXYTOCIN 10 UNITS: 10 INJECTION, SOLUTION INTRAMUSCULAR; INTRAVENOUS at 12:29

## 2022-01-08 RX ADMIN — DOCUSATE SODIUM 100 MG: 100 CAPSULE, LIQUID FILLED ORAL at 16:14

## 2022-01-08 RX ADMIN — Medication 200 MCG: at 11:46

## 2022-01-08 RX ADMIN — CEFAZOLIN 3 G: 10 INJECTION, POWDER, FOR SOLUTION INTRAVENOUS; PARENTERAL at 10:11

## 2022-01-08 RX ADMIN — BUPIVACAINE HYDROCHLORIDE 1.6 ML: 7.5 INJECTION, SOLUTION EPIDURAL; RETROBULBAR at 11:32

## 2022-01-08 RX ADMIN — Medication 200 MCG: at 12:15

## 2022-01-08 RX ADMIN — SODIUM CITRATE AND CITRIC ACID MONOHYDRATE 30 ML: 500; 334 SOLUTION ORAL at 10:15

## 2022-01-08 RX ADMIN — SODIUM CHLORIDE, POTASSIUM CHLORIDE, SODIUM LACTATE AND CALCIUM CHLORIDE 125 ML/HR: 600; 310; 30; 20 INJECTION, SOLUTION INTRAVENOUS at 16:17

## 2022-01-08 NOTE — ANESTHESIA POSTPROCEDURE EVALUATION
Patient: Lovely Daniels    Procedure Summary     Date: 22 Room / Location: [facility] LABOR DELIVERY  [facility] LABOR DELIVERY    Anesthesia Start: 1058 Anesthesia Stop: 1238    Procedure:  SECTION REPEAT WITH TUBAL (Bilateral Abdomen) Diagnosis: (37/0, gestational htn, )    Surgeons: Jaguar Dumont MD Provider: Andre Paez MD    Anesthesia Type: spinal ASA Status: 3          Anesthesia Type: spinal    Vitals  Vitals Value Taken Time   BP 99/42 22 1300   Temp     Pulse 95 22 1300   Resp 14 22 1300   SpO2 98 % 22 1300           Post Anesthesia Care and Evaluation    Patient location during evaluation: bedside  Patient participation: complete - patient participated  Level of consciousness: awake  Pain management: adequate  Airway patency: patent  Anesthetic complications: No anesthetic complications  PONV Status: none  Cardiovascular status: acceptable and stable  Respiratory status: acceptable  Hydration status: acceptable    Comments: An Anesthesiologist personally participated in the most demanding procedures (including induction and emergence if applicable) in the anesthesia plan, monitored the course of anesthesia administration at frequent intervals and remained physically present and available for immediate diagnosis and treatment of emergencies.

## 2022-01-08 NOTE — ANESTHESIA PROCEDURE NOTES
Spinal Block      Start Time: 1/8/2022 11:34 AM  Stop Time: 1/8/2022 11:27 AM  Performed By  Anesthesiologist: Andre Paez MD  Spinal Block Prep:  Patient Position:sitting  Spinal Block Procedure  Approach:midline  Guidance:palpation technique  Location:L3-L4  Needle Type:Sprotte  Needle Gauge:22 G  Medications: morphine PF (DURAMORPH) injection, 0.1 mg  fentaNYL (SUBLIMAZE) injection, 15 mcg  bupivacaine PF (MARCAINE) injection 0.75%, 1.6 mL

## 2022-01-08 NOTE — OP NOTE
[place]   Section Operative Note    Pre-Operative Dx:   1.  IUP at Gestational Age: 37w0d  weeks    2. Mild preeclampsia antepartum   3. history of  section  4. morbid obesity  5. desire for sterilization  6. Asthma  7. tobacco use  8. history COVID-19  9. anemia in pregnancy         Postoperative dx:    1.  Same     Procedure: Procedure(s):   SECTION REPEAT WITH TUBAL   Surgeon/Assistant: Surgeon(s):  Jaguar Dumont MD         Anesthesia:  Anesthesiologist: Choice  Anesthesiologist: Andre Paez MD     EBL: 500 mls.          QBL:          IV Fluids: 2200 mls.   UOP: 75 mls.    I/O this shift:  In: 2200 [I.V.:2200]  Out: 575 [Urine:75; Blood:500]   Antibiotics: cefazolin (Ancef), 3 grams     Infant:           Gender: female  infant    Weight: 2890 g (6 lb 5.9 oz)     Apgars: 5  @ 1 minute /     8  @ 5 minutes    Cord gases: Venous:  No results found for: PHCVEN     Arterial:  No results found for: PHCART     Indication for C/Section:   History of  section, desires repeat with sterilization      Priority for C/Section:   Scheduled     Procedure Details:   The patient was brought to the operating room and spinal anesthesia was deemed to be adequate.  She was prepped and draped in a normal sterile fashion and placed in supine position with a leftward tilt.  A vaginal prep was performed.  A Pfannenstiel skin incision was made approximately 2 fingerbreadths above the symphysis pubis and carried down to the underlying layer of fascia.   The fascia was nicked in the midline and extended laterally sharply.  The underlying rectus muscle was dissected off bluntly.  The midline was identified and opened in a blunt fashion with no noted damage to underlying bowel, bladder, blood vessels, or organs.  The Jose M retractor was placed .The bladder blade was placed.  A bladder flap was created.  The bladder blade was replaced.  The lower uterine segment was incised in a transverse fashion  and extended laterally in a manual fashion.  Fluid was noted to be clear.  The fetal vertex was brought up atraumatically through the uterine incision.  The anterior and posterior shoulders were delivered easily.  The remainder of the body delivered.  The infant demonstrated good tone,color and cry.  The cord was clamped and cut after a delay of 30 seconds and the infant was handed off to [facility] baby care.  TXA was then given for hemorrhage prophylaxis.  A segment of cord was handed off to the technician for collection of cord blood and cord gases.  The placenta delivered with the assistance of fundal massage and was sent to pathology. The uterus was exteriorized and cleared of all clots and debris.  The uterine incision was repaired with 0 Monocryl in a running fashion.  A second imbricating stitch of 0-Monocryl was placed.  Excellent hemostasis was assured.      Attention was then turned to performing the bilateral salpingectomy for desired sterilization.  The right fallopian tube was elevated with [place] clamps, and at the distal end the LigaSure device was used to cauterize and ligate the fallopian tube from the mesosalpinx.  This was carried in a proximal direction towards the uterine cornua.  The right fallopian tube was then transected from the uterus cornua.  Similarly this was carried out on the left side.  Both specimens were sent together for permanent pathology.    The uterus was placed back into the pelvic cavity.  Copious irrigation was performed.  The gutters were cleared of all clot and debris.  The uterine incision was reinspected off tension and noted to be hemostatic.  The parietal peritoneum was closed with 3-0 Vicryl.  The rectus muscles and fascia were noted to be hemostatic.  A fascial hernia was noted on the right aspect of the fascial incision approximately 3 cm in width, this was reapproximated with 0 Vicryl and closed.  The fascial incision was then closed with 0 Vicryl in a running  fashion starting at the contralateral angle and closed at the ipsilateral end to the surgeon.  The subcutaneous tissue was copiously irrigated and made hemostatic.  It was reapproximated using 3-0 Vicryl and the skin was closed with staples.    The patient tolerated the procedure well.  Instrument, lap, and needle counts were correct.  The patient received antibiotics prior to the procedure.  She was taken to the recovery room in stable condition.      Jaguar Dumont MD          Complications:   None      Disposition:   Mother to Mother Baby/Postpartum  in stable condition currently.   Baby to NBN  in stable condition currently.       Jaguar Dumont MD  1/8/2022  12:43 EST

## 2022-01-08 NOTE — H&P
BARBARA Burdick  Obstetric History and Physical    Chief Complaint:  Scheduled CS    Subjective:  The patient is a 26 y.o.  currently at 37w0d, who presents for scheduled  section for mild preeclampsia. Patient denies any headache, visual disturbances, new onset nausea vomiting, right upper quadrant pain, or new onset swelling.  Reports good fetal movement, no loss of fluid, no vaginal bleeding, no regular contractions.  Patient's medical course complicated by asthma in pregnancy, morbid obesity, history of prior  section, and the development of mild preeclampsia.  She denies fevers, chills, shortness of breath, chest pains.       Her prenatal care is complicated by: Elevated BMI- pre-pregnancy, Pre-eclampsia, Asthma    The following portions of the patients history were reviewed and updated as appropriate: current medications, allergies, past medical history, past surgical history, past family history, past social history and problem list .     Prenatal Information:  Prenatal Results     POC Urine Glucose/Protein     Test Value Reference Range Date Time    Urine Glucose  Negative mg/dL Negative, 1000 mg/dL (3+) 21 1410    Urine Protein  2+ mg/dL Negative 21 1410          Initial Prenatal Labs     Test Value Reference Range Date Time    Hemoglobin  9.4 g/dL 12.0 - 15.9 21 1443       9.4 g/dL 12.0 - 15.9 21 1202       9.9 g/dL 12.0 - 15.9 21 1324       10.4 g/dL 12.0 - 15.9 21 1459    Hematocrit  30.8 % 34.0 - 46.6 21 1443       30.5 % 34.0 - 46.6 21 1202       31.5 % 34.0 - 46.6 21 1324       32.4 % 34.0 - 46.6 21 1459    Platelets  368 10*3/mm3 140 - 450 21 1443       362 10*3/mm3 140 - 450 21 1202       332 10*3/mm3 140 - 450 21 1324       330 10*3/mm3 140 - 450 21 1459    Rubella IgG  2.61 index Immune >0.99 21 1459    Hepatitis B SAg  Non-Reactive  Non-Reactive 21 1459    Hepatitis C Ab  Non-Reactive   Non-Reactive 09/21/21 1459    RPR  Non-Reactive  Non-Reactive 09/21/21 1459    ABO  A   09/21/21 1459    Rh  Positive   09/21/21 1459    Antibody Screen  Negative   09/21/21 1459    HIV  Non-Reactive  Non-Reactive 09/21/21 1459    Urine Culture  No growth   09/21/21 1456    Gonorrhea ^ neg   09/21/21     Chlamydia  NOT DETECTED NA  09/17/20 1654    TSH        HgB A1c               2nd and 3rd Trimester     Test Value Reference Range Date Time    Hemoglobin (repeated)  9.4 g/dL 12.0 - 15.9 12/30/21 1443       9.4 g/dL 12.0 - 15.9 12/23/21 1202       9.9 g/dL 12.0 - 15.9 11/01/21 1324    Hematocrit (repeated)  30.8 % 34.0 - 46.6 12/30/21 1443       30.5 % 34.0 - 46.6 12/23/21 1202       31.5 % 34.0 - 46.6 11/01/21 1324    Platelets   368 10*3/mm3 140 - 450 12/30/21 1443       362 10*3/mm3 140 - 450 12/23/21 1202       332 10*3/mm3 140 - 450 11/01/21 1324       330 10*3/mm3 140 - 450 09/21/21 1459    GCT  158 mg/dL 65 - 139 11/01/21 1324    Antibody Screen (repeated)        GTT Fasting  115 mg/dL 65 - 94 11/01/21 1227    GTT 1 Hr        GTT 2 Hr        GTT 3 Hr        Group B Strep  No Group B Streptococcus isolated   12/30/21 1441          Drug Screening     Test Value Reference Range Date Time    Amphetamine Screen        Barbiturate Screen        Benzodiazepine Screen        Methadone Screen        Phencyclidine Screen        Opiates Screen        THC Screen        Cocaine Screen        Propoxyphene Screen        Buprenorphine Screen        Methamphetamine Screen        Oxycodone Screen        Tricyclic Antidepressants Screen              Other (Risk screening)     Test Value Reference Range Date Time    Varicella IgG        Parvovirus IgG        CMV IgG        Cystic Fibrosis        Hemoglobin electrophoresis        NIPT        MSAFP-4        AFP (for NTD only)              Legend    ^: Historical                      External Prenatal Results     Pregnancy Outside Results - Transcribed From Office Records - See  Scanned Records For Details     Test Value Date Time    ABO  A  21 1459    Rh  Positive  21 1459    Antibody Screen  Negative  21 1459    Varicella IgG       Rubella  2.61 index 21 1459    Hgb  9.4 g/dL 21 1443       9.4 g/dL 21 1202       9.9 g/dL 21 1324       10.4 g/dL 21 1459    Hct  30.8 % 21 1443       30.5 % 21 1202       31.5 % 21 1324       32.4 % 21 1459    Glucose Fasting GTT  115 mg/dL 21 1227    Glucose Tolerance Test 1 hour       Glucose Tolerance Test 3 hour       Gonorrhea (discrete) ^ neg  21     Chlamydia (discrete)  NOT DETECTED NA 20 1654    RPR  Non-Reactive  21 1459    VDRL       Syphilis Antibody       HBsAg  Non-Reactive  21 1459    Herpes Simplex Virus PCR       Herpes Simplex VIrus Culture       HIV  Non-Reactive  21 1459    Hep C RNA Quant PCR       Hep C Antibody  Non-Reactive  21 1459    AFP       Group B Strep  No Group B Streptococcus isolated  21 1441    GBS Susceptibility to Clindamycin       GBS Susceptibility to Erythromycin       Fetal Fibronectin       Genetic Testing, Maternal Blood             Drug Screening     Test Value Date Time    Urine Drug Screen       Amphetamine Screen  NEGATIVE NA 19 1045    Barbiturate Screen  NEGATIVE NA 19 1045    Benzodiazepine Screen  NEGATIVE NA 19 1045    Methadone Screen  NEGATIVE NA 19 1045    Phencyclidine Screen  NEGATIVE NA 19 1045    Opiates Screen       THC Screen  NEGATIVE NA 19 1045    Cocaine Screen       Propoxyphene Screen       Buprenorphine Screen       Methamphetamine Screen       Oxycodone Screen  NEGATIVE NA 19 1045    Tricyclic Antidepressants Screen             Legend    ^: Historical                        Past OB History:  OB History    Para Term  AB Living   2 1 1 0 0 1   SAB IAB Ectopic Molar Multiple Live Births   0 0 0 0 0 1      # Outcome Date GA  Lbr Jovi/2nd Weight Sex Delivery Anes PTL Lv   2 Current            1 Term 08/15/19 38w0d  3884 g (8 lb 9 oz) M CS-LTranv   IVÁN      Complications: Failure to Progress in First Stage       Past Medical History:  Past Medical History:   Diagnosis Date   • Asthma    • Gonorrhea    • Preeclampsia     G1       Past Surgical History:  Past Surgical History:   Procedure Laterality Date   •  SECTION     • WISDOM TOOTH EXTRACTION         Family History:  Family History   Problem Relation Age of Onset   • Diabetes Mother    • Melanoma Maternal Grandmother    • Ovarian cancer Maternal Grandmother    • Breast cancer Other    • Diabetes Maternal Aunt    • Hyperlipidemia Maternal Aunt    • Heart disease Maternal Uncle        Social History:   reports that she has been smoking cigarettes. She has a 2.00 pack-year smoking history. She has never used smokeless tobacco.   reports previous alcohol use.   reports no history of drug use.    Medications:  Prenatal Vitamin, albuterol sulfate HFA, aspirin, famotidine, and ferrous sulfate    Allergies:  No Known Allergies    Review of Systems:  No leaking fluid, No vaginal bleeding, No contractions, Adequate FM, No HA, No scotomata or vision changes, No RUQ/epigastric pain, No swelling, No fever/chills, No nausea, No vomiting, No diarrhea, No constipation, No abdominal pain and No back pain    Objective     Vital Signs:  BP: (139)/(81) 139/81     Physical Exam:    General:  alert, well appearing, in no apparent distress, oriented to person, place and time, overweight, anxious  HEENT: PERRLA, extra ocular movement intact, sclera clear, anicteric and neck supple with midline trachea  Cardiovascular: normal rate, regular rhythm,  no murmurs, rubs, or gallops  Lungs: clear to auscultation, no wheezes, rales or rhonchi, symmetric air entry  Abdomen: soft, nontender, nondistended, no abnormal masses, no epigastric pain, fundus soft, nontender 37 weeks size and estimated fetal weight:  3000  Extremities: 1+ edema, no redness or tenderness in the calves or thighs 2+ bilaterally  Pelvic exam: deferred     Fetal Assessment:    FHR:   Baseline: 135  Variability: Moderate  Accelerations: Present (32 weeks+) 15 x 15 bpm  Decelerations: Absent   Category: Category 1    Contractions: Not som    Fetal Presentation: cephalic    Labs:   Lab Results (last 24 hours)     Procedure Component Value Units Date/Time    CBC (No Diff) [[id number]]  (Abnormal) Collected: 22    Specimen: Blood Updated: 22 1004     WBC 15.23 10*3/mm3      RBC 4.07 10*6/mm3      Hemoglobin 9.7 g/dL      Hematocrit 32.0 %      MCV 78.6 fL      MCH 23.8 pg      MCHC 30.3 g/dL      RDW 19.4 %      RDW-SD 53.6 fl      MPV 10.4 fL      Platelets 392 10*3/mm3     Comprehensive Metabolic Panel [[id number]] Collected: 22    Specimen: Blood Updated: 22 0957           Hospital Problems:    H/O:     Antepartum mild preeclampsia    Morbid obesity with BMI of 45.0-49.9, adult (HCC)    Hx of preeclampsia, prior pregnancy, currently pregnant    Previous  section    COVID-19    Asthma    Smoker      Assessment:  26 y.o.  currently at 37w0d    H/O:     Antepartum mild preeclampsia    Morbid obesity with BMI of 45.0-49.9, adult (HCC)    Hx of preeclampsia, prior pregnancy, currently pregnant    Previous  section    COVID-19    Asthma    Smoker    GBS: Negative    Plan:  , bilateral salpingectomy, possible occlusion of one or both tubes  3 g of Ancef  TXA 1 g to be given after cord clamping  Plan of care has been reviewed with patient  Risks, benefits of treatment plan have been discussed.  All questions have been answered.  Would take blood transfusion if desired, consented after risk benefits and alternatives discussed   hysterectomy if necessary    The patient was counseled for the need of  section.  The risk, benefits, and alternatives of surgery  were discussed with the patient.  The risks discussed included but were not limited to:  • bleeding  • infection  • injury to surrounding structures including bowel and bladder  • injury to vasculature  • injury to   • thrombosis  • need for  hysterectomy  • Risk of disfiguring scar  • Need for blood transfusion  • Maternal mortality    After the patient was adequately counseled, the patient consented to move forward with  delivery.      Jaguar Dumont MD  2022  10:13 EST

## 2022-01-08 NOTE — ANESTHESIA PREPROCEDURE EVALUATION
Anesthesia Evaluation     Patient summary reviewed and Nursing notes reviewed   no history of anesthetic complications:  NPO Solid Status: > 8 hours  NPO Liquid Status: > 2 hours           Airway   Mallampati: II  TM distance: >3 FB  Neck ROM: full  No difficulty expected  Dental    (+) poor dentition        Pulmonary - normal exam    breath sounds clear to auscultation  (+) asthma,  Cardiovascular - normal exam  Exercise tolerance: good (4-7 METS)    Rhythm: regular    (+) hypertension,       Neuro/Psych- negative ROS  GI/Hepatic/Renal/Endo    (+) obesity,       Musculoskeletal (-) negative ROS    Abdominal    Substance History - negative use     OB/GYN    (+) Pregnant, pregnancy induced hypertension        Other - negative ROS                       Anesthesia Plan    ASA 3     spinal       Anesthetic plan, all risks, benefits, and alternatives have been provided, discussed and informed consent has been obtained with: patient and mother.

## 2022-01-09 PROCEDURE — 0503F POSTPARTUM CARE VISIT: CPT | Performed by: STUDENT IN AN ORGANIZED HEALTH CARE EDUCATION/TRAINING PROGRAM

## 2022-01-09 RX ORDER — NICOTINE 21 MG/24HR
1 PATCH, TRANSDERMAL 24 HOURS TRANSDERMAL
Status: DISCONTINUED | OUTPATIENT
Start: 2022-01-09 | End: 2022-01-10 | Stop reason: HOSPADM

## 2022-01-09 RX ORDER — FERROUS SULFATE 325(65) MG
325 TABLET ORAL
Status: DISCONTINUED | OUTPATIENT
Start: 2022-01-09 | End: 2022-01-09

## 2022-01-09 RX ADMIN — ALBUTEROL SULFATE 2 PUFF: 90 AEROSOL, METERED RESPIRATORY (INHALATION) at 08:11

## 2022-01-09 RX ADMIN — VITAMIN A, VITAMIN C, VITAMIN D, VITAMIN E, THIAMINE, RIBOFLAVIN, NIACIN, VITAMIN B6, FOLIC ACID, VITAMIN B12, CALCIUM, IRON, ZINC, COPPER 1 TABLET: 4000; 120; 400; 22; 1.84; 3; 20; 10; 1; 12; 200; 27; 25; 2 TABLET ORAL at 08:10

## 2022-01-09 RX ADMIN — ACETAMINOPHEN 1000 MG: 500 TABLET ORAL at 15:34

## 2022-01-09 RX ADMIN — ALBUTEROL SULFATE 2 PUFF: 90 AEROSOL, METERED RESPIRATORY (INHALATION) at 01:30

## 2022-01-09 RX ADMIN — ALBUTEROL SULFATE 2 PUFF: 90 AEROSOL, METERED RESPIRATORY (INHALATION) at 20:40

## 2022-01-09 RX ADMIN — HYDROCODONE BITARTRATE AND ACETAMINOPHEN 1 TABLET: 5; 325 TABLET ORAL at 08:10

## 2022-01-09 RX ADMIN — IBUPROFEN 800 MG: 800 TABLET, FILM COATED ORAL at 17:30

## 2022-01-09 RX ADMIN — NICOTINE 1 PATCH: 14 PATCH, EXTENDED RELEASE TRANSDERMAL at 15:43

## 2022-01-09 RX ADMIN — ACETAMINOPHEN 1000 MG: 500 TABLET ORAL at 10:58

## 2022-01-09 RX ADMIN — OXYCODONE HYDROCHLORIDE 5 MG: 5 TABLET ORAL at 15:34

## 2022-01-09 RX ADMIN — ALBUTEROL SULFATE 2 PUFF: 90 AEROSOL, METERED RESPIRATORY (INHALATION) at 13:02

## 2022-01-09 RX ADMIN — IBUPROFEN 800 MG: 800 TABLET, FILM COATED ORAL at 22:14

## 2022-01-09 RX ADMIN — DOCUSATE SODIUM 100 MG: 100 CAPSULE, LIQUID FILLED ORAL at 08:09

## 2022-01-09 RX ADMIN — OXYCODONE HYDROCHLORIDE 5 MG: 5 TABLET ORAL at 20:55

## 2022-01-09 NOTE — PROGRESS NOTES
BARBARA Burdick   PROGRESS NOTE    Post-Op Day 1 S/P     Subjective:  Patient has no complaints  Pain controlled  Tolerating a regular diet  Not passing flatus  Ambulating  Urinating spontaneously  Lochia decreasing, no bleeding concerns  Denies HA, vision change, or RUQ/epigastric pain  No lightheadedness or dizziness    Objective    Objective     Temp:  [97.7 °F (36.5 °C)-98.5 °F (36.9 °C)] 97.7 °F (36.5 °C)  Heart Rate:  [] 101  Resp:  [14-22] 20  BP: ()/(42-98) 113/62  Flow (L/min):  [2] 2     Physical Exam:  GEN: alert and in no distress  Abdomen: fundus firm - below the umbilicus  abdomen soft + BS's  appropriately tender  Incision: clean, dry, and intact, healing well, no drainage, no erythema, no hernia, no seroma, no swelling, well approximated, staples in place  ExtremiHoman's sign negative bilaterally, no redness or tenderness in the calves or thighsties:     Labs:  Lab Results (last 24 hours)     Procedure Component Value Units Date/Time    CBC (No Diff) [[id number]]  (Abnormal) Collected: 22    Specimen: Blood Updated: 22 1004     WBC 15.23 10*3/mm3      RBC 4.07 10*6/mm3      Hemoglobin 9.7 g/dL      Hematocrit 32.0 %      MCV 78.6 fL      MCH 23.8 pg      MCHC 30.3 g/dL      RDW 19.4 %      RDW-SD 53.6 fl      MPV 10.4 fL      Platelets 392 10*3/mm3     Comprehensive Metabolic Panel [[id number]]  (Abnormal) Collected: 22    Specimen: Blood Updated: 22 1030     Glucose 94 mg/dL      BUN 7 mg/dL      Creatinine 0.56 mg/dL      Sodium 134 mmol/L      Potassium 4.2 mmol/L      Chloride 102 mmol/L      CO2 20.2 mmol/L      Calcium 9.4 mg/dL      Total Protein 6.6 g/dL      Albumin 3.10 g/dL      ALT (SGPT) 9 U/L      AST (SGOT) 14 U/L      Alkaline Phosphatase 315 U/L      Total Bilirubin 0.3 mg/dL      eGFR Non African Amer 133 mL/min/1.73      Globulin 3.5 gm/dL      A/G Ratio 0.9 g/dL      BUN/Creatinine Ratio 12.5     Anion Gap 11.8 mmol/L      Narrative:      GFR Normal >60  Chronic Kidney Disease <60  Kidney Failure <15      Urine Drug Screen - Urine, Clean Catch [[id number]]  (Normal) Collected: 22 1004    Specimen: Urine, Clean Catch Updated: 22 1053     Amphet/Methamphet, Screen Negative     Barbiturates Screen, Urine Negative     Benzodiazepine Screen, Urine Negative     Cocaine Screen, Urine Negative     Opiate Screen Negative     THC, Screen, Urine Negative     Methadone Screen, Urine Negative     Oxycodone Screen, Urine Negative    Narrative:      Negative Thresholds Per Drugs Screened:    Amphetamines                 500 ng/ml  Barbiturates                 200 ng/ml  Benzodiazepines              100 ng/ml  Cocaine                      300 ng/ml  Methadone                    300 ng/ml  Opiates                      300 ng/ml  Oxycodone                    100 ng/ml  THC                           50 ng/ml    The Normal Value for all drugs tested is negative. This report includes final unconfirmed screening results to be used for medical treatment purposes only. Unconfirmed results must not be used for non-medical purposes such as employment or legal testing. Clinical consideration should be applied to any drug of abuse test, particularly when unconfirmed results are used.                     Assessment/Plan        H/O:     Antepartum mild preeclampsia    Morbid obesity with BMI of 45.0-49.9, adult (HCC)    Hx of preeclampsia, prior pregnancy, currently pregnant    Previous  section    COVID-19    Asthma    Smoker    Maternal anemia in pregnancy, antepartum     delivery delivered    Assessment & Plan    Assessment:    Lovely Daniels is Day 1  post-partum  , Low Transverse   .      Plan:    Routine postpartum/postop care    Remove Harman    Encourage incentive spirometry    Remove dressing after 24 hours.    Contraception: s/p bilateral salpingectomy     Breast-feeding bottle    Advance diet, Ambulate, Saline  lock IV, Remove bandage, Shower, PO pain meds, Importance of wound care/keep clean and dry, HTN precautions reviewed in detail.  Questions answered.  RTO/ER for HA not relieved w tylenol, vision changes, epig/RUQ pain, or Bps elevated at home.        Jaguar Dumont MD  01/09/22  08:14 EST

## 2022-01-09 NOTE — PLAN OF CARE
Goal Outcome Evaluation:    Patient has been up ad benjamin and had appropriate output since discontinuation of the jerry catheter.

## 2022-01-10 VITALS
OXYGEN SATURATION: 96 % | HEART RATE: 93 BPM | SYSTOLIC BLOOD PRESSURE: 116 MMHG | TEMPERATURE: 98.5 F | RESPIRATION RATE: 18 BRPM | BODY MASS INDEX: 53.55 KG/M2 | WEIGHT: 291 LBS | DIASTOLIC BLOOD PRESSURE: 60 MMHG | HEIGHT: 62 IN

## 2022-01-10 PROCEDURE — 0503F POSTPARTUM CARE VISIT: CPT | Performed by: STUDENT IN AN ORGANIZED HEALTH CARE EDUCATION/TRAINING PROGRAM

## 2022-01-10 RX ORDER — IBUPROFEN 800 MG/1
800 TABLET, FILM COATED ORAL EVERY 8 HOURS SCHEDULED
Qty: 21 TABLET | Refills: 0 | Status: SHIPPED | OUTPATIENT
Start: 2022-01-10 | End: 2022-01-17

## 2022-01-10 RX ORDER — OXYCODONE HYDROCHLORIDE 5 MG/1
5 TABLET ORAL EVERY 6 HOURS PRN
Qty: 12 TABLET | Refills: 0 | Status: SHIPPED | OUTPATIENT
Start: 2022-01-10 | End: 2022-01-13

## 2022-01-10 RX ORDER — ACETAMINOPHEN 500 MG
1000 TABLET ORAL EVERY 6 HOURS
Qty: 56 TABLET | Refills: 0 | Status: SHIPPED | OUTPATIENT
Start: 2022-01-10 | End: 2022-01-17

## 2022-01-10 RX ADMIN — IBUPROFEN 800 MG: 800 TABLET, FILM COATED ORAL at 06:00

## 2022-01-10 RX ADMIN — ALBUTEROL SULFATE 2 PUFF: 90 AEROSOL, METERED RESPIRATORY (INHALATION) at 08:35

## 2022-01-10 RX ADMIN — ACETAMINOPHEN 1000 MG: 500 TABLET ORAL at 11:00

## 2022-01-10 RX ADMIN — ACETAMINOPHEN 1000 MG: 500 TABLET ORAL at 04:25

## 2022-01-10 RX ADMIN — DOCUSATE SODIUM 100 MG: 100 CAPSULE, LIQUID FILLED ORAL at 08:26

## 2022-01-10 RX ADMIN — FERROUS SULFATE TAB 325 MG (65 MG ELEMENTAL FE) 325 MG: 325 (65 FE) TAB at 08:26

## 2022-01-10 RX ADMIN — ALBUTEROL SULFATE 2 PUFF: 90 AEROSOL, METERED RESPIRATORY (INHALATION) at 02:01

## 2022-01-10 RX ADMIN — VITAMIN A, VITAMIN C, VITAMIN D, VITAMIN E, THIAMINE, RIBOFLAVIN, NIACIN, VITAMIN B6, FOLIC ACID, VITAMIN B12, CALCIUM, IRON, ZINC, COPPER 1 TABLET: 4000; 120; 400; 22; 1.84; 3; 20; 10; 1; 12; 200; 27; 25; 2 TABLET ORAL at 08:26

## 2022-01-10 NOTE — DISCHARGE INSTRUCTIONS
DR. ROMERO'S POSTPARTUM DISCHARGE PRECAUTIONS and Answers to FAQs    NO SEX for [SIX] weeks.    NO TUB BATH or POOL for [TWO] week(s), shower only.  Sitz baths are fine.    STITCHES (if present):  wash them daily in the shower with soap and water (any type of soap is fine, it does not need to be antibacterial soap).  It is ok to gently put your finger in and around the vaginal area.  Look at your stitches (the ones on the outside) when you get home.  You will then know what is normal and can have a point of reference to compare it to if you start to have concerns.  REDNESS, PUS, increase in PAIN, FEVER or CHILLS are all reasons to be seen our office immediately.  Go to the ER, if it is after hours or a weekend.      VAGINAL BLEEDING:  may continue on and off over the next several weeks after delivery and may increase slightly once you go home.  You should not be bleeding more than 1 large pad soaked every hour or two.  Clots (even the size of a lemon or larger) may be normal as long as the bleeding is not heavy and the clots do not continue.      FEVER or CHILLS or NOT FEELING WELL: call our office.  If the office is closed, you need to be seen in acute care or ER.      CHEST PAIN or SHORTNESS OF BREATH/AIR: you need to GO TO THE NEAREST ER or CALL 911.     SWELLING: can increase over the next 7-10 days and then should slowly improve.  Your legs/ankles should be fairly similar in size.  A red, painful, hot, swollen leg (usually just one side) can be a sign of a blood clot and should be evaluated immediately.  Call our office.  If it is after hours or a weekend, you must be seen IMMEDIATELY IN THE ER.     ELEVATED BLOOD PRESSURE:  you need to contact us if you are having  persistent elevated BP systolic (top number) more than [155] or diastolic (bottom number) more than [95], or a headache (not relieved with rest, hydration or over the counter pain reliever), an increase in your swelling (usually hands and face),  changes in your vision (typically flashing white or black spots) or severe persistent pain in the location of the upper right side of your belly (under your right breast).  Call our office or go to ER if after hours or a weekend.    LACTATION QUESTIONS or CONCERNS?  Call [facility] Lactation Support [phone].    WORK and SCHOOL TIME OFF: depends on your specific delivery type, surrounding circumstances, and your work insurance/school rules.  If you have questions, please call Jess or Katherine at [phone] (ext. [phone]).  Or email Jess at [email or web address].  They will assist in required paperwork for you and/or family members.     Any further QUESTIONS or CONCERNS, please call [facility] at [phone].

## 2022-01-10 NOTE — PLAN OF CARE
Problem: Adult Inpatient Plan of Care  Goal: Plan of Care Review  Outcome: Ongoing, Progressing  Goal: Patient-Specific Goal (Individualized)  Outcome: Ongoing, Progressing  Goal: Absence of Hospital-Acquired Illness or Injury  Outcome: Ongoing, Progressing  Intervention: Identify and Manage Fall Risk  Recent Flowsheet Documentation  Taken 1/10/2022 0438 by Mercedes May RN  Safety Promotion/Fall Prevention: safety round/check completed  Taken 1/10/2022 0400 by Mercedes May RN  Safety Promotion/Fall Prevention: safety round/check completed  Taken 1/10/2022 0200 by Mercedes May RN  Safety Promotion/Fall Prevention: safety round/check completed  Taken 1/9/2022 2330 by Mercedes May RN  Safety Promotion/Fall Prevention: safety round/check completed  Taken 1/9/2022 2235 by Mercedes May RN  Safety Promotion/Fall Prevention: safety round/check completed  Taken 1/9/2022 2150 by Mercedes May RN  Safety Promotion/Fall Prevention: safety round/check completed  Taken 1/9/2022 2115 by Mercedes May RN  Safety Promotion/Fall Prevention: safety round/check completed  Taken 1/9/2022 2050 by Mercedes May RN  Safety Promotion/Fall Prevention: safety round/check completed  Taken 1/9/2022 1940 by Mercedes May RN  Safety Promotion/Fall Prevention: safety round/check completed  Taken 1/9/2022 1910 by Mercedes May RN  Safety Promotion/Fall Prevention: safety round/check completed  Intervention: Prevent and Manage VTE (venous thromboembolism) Risk  Recent Flowsheet Documentation  Taken 1/9/2022 2050 by Mercedes May RN  VTE Prevention/Management: (up ad benjamin) --  Goal: Optimal Comfort and Wellbeing  Outcome: Ongoing, Progressing  Intervention: Provide Person-Centered Care  Recent Flowsheet Documentation  Taken 1/9/2022 2050 by Mercedes May RN  Trust Relationship/Rapport:   questions encouraged   questions answered   empathic listening provided   care explained   choices provided   emotional support provided  Goal:  Readiness for Transition of Care  Outcome: Ongoing, Progressing     Problem: Bleeding ( Delivery)  Goal: Bleeding is Controlled  Outcome: Ongoing, Progressing     Problem: Change in Fetal Wellbeing ( Delivery)  Goal: Stable Fetal Wellbeing  Outcome: Ongoing, Progressing     Problem: Infection ( Delivery)  Goal: Absence of Infection Signs and Symptoms  Outcome: Ongoing, Progressing     Problem: Respiratory Compromise ( Delivery)  Goal: Effective Oxygenation and Ventilation  Outcome: Ongoing, Progressing  Intervention: Optimize Oxygenation and Ventilation  Recent Flowsheet Documentation  Taken 2022 by Mercedes May RN  Airway/Ventilation Management:   calming measures promoted   airway patency maintained     Problem: Hypertensive Disorders in Pregnancy  Goal: Maternal-Fetal Stabilization  Outcome: Ongoing, Progressing   Goal Outcome Evaluation:   Pt. Continues to progress; encouraged to turn, cough, and deep breathe and use incentive spirometer. Minimal bleeding noted; fundus remains firm. Ambulating ad benjamin. Bonding with infant appropriately.

## 2022-01-10 NOTE — PROGRESS NOTES
" Burdick   PROGRESS NOTE    Post-Op Day 2 S/P     Subjective:  Patient has no complaints  Pain controlled  Tolerating a regular diet  passing flatus  Ambulating  Urinating spontaneously  Lochia decreasing, no bleeding concerns  Denies HA, vision change, or RUQ/epigastric pain  No lightheadedness or dizziness    Objective    Objective:  Vital signs (most recent): Blood pressure 113/62, pulse 102, temperature 97.8 °F (36.6 °C), temperature source Oral, resp. rate 20, height 157.5 cm (62\"), weight 132 kg (291 lb), last menstrual period 2021, SpO2 96 %, not currently breastfeeding.       Temp:  [97 °F (36.1 °C)-97.8 °F (36.6 °C)] 97.8 °F (36.6 °C)  Heart Rate:  [101-102] 102  Resp:  [18-20] 20  BP: (113-130)/(58-76) 113/62     Physical Exam:  GEN: alert and in no distress  Abdomen: fundus firm - below the umbilicus  abdomen soft + BS's  appropriately tender  Incision: clean, dry, and intact, healing well, no drainage, no erythema, no hernia, no seroma, no swelling, well approximated, staples in place  ExtremiHoman's sign negative bilaterally, no redness or tenderness in the calves or thighsties:     Labs:  Lab Results (last 24 hours)     ** No results found for the last 24 hours. **             Assessment/Plan        H/O:     Antepartum mild preeclampsia    Morbid obesity with BMI of 45.0-49.9, adult (HCC)    Hx of preeclampsia, prior pregnancy, currently pregnant    Previous  section    COVID-19    Asthma    Smoker    Maternal anemia in pregnancy, antepartum     delivery delivered    Assessment & Plan    Assessment:    Lovely Daniels is Day 2  post-partum  , Low Transverse   .      Plan:    Routine postpartum/postop care    Voiding spontaneously     Encourage incentive spirometry    Incision C/D/I.    Contraception: s/p bilateral salpingectomy     Breast-feeding bottle    Advance diet, Ambulate, remove IV, Remove bandage, Shower, PO pain meds, Importance of wound " care/keep clean and dry, HTN precautions reviewed in detail.  Questions answered.  RTO/ER for HA not relieved w tylenol, vision changes, epig/RUQ pain, or Bps elevated at home.  D/C home today         Jaguar Dumont MD  01/10/22  08:44 EST

## 2022-01-10 NOTE — DISCHARGE SUMMARY
[facility]         DISCHARGE SUMMARY    Patient Name: Lovely Daniels  : 1995  MRN: [id number]    Date of Admission: 2022  Date of Discharge:  1/10/2022   Primary Care Physician: Shannan De La Cruz DO    Consults     No orders found from 12/10/2021 to 2022.           Procedures:  Repeat low-transverse  section  Bilateral salpingectomy for desired sterilization    Presenting Problem:   H/O:  [Z98.891]    Admitting Diagnosis:  Term pregnancy  Mild preeclampsia  Morbid obesity  History of  section  Asthma  Smoker  Anemia in pregnancy    Discharge Diagnosis:  Low transverse  section  Bilateral salpingectomy  Mild preeclampsia  Morbid obesity  History of  section  Asthma  Smoker  Anemia in pregnancy    Delivery Summary     OB Surgeon:  Jaguar Dumont MD  Anesthesia: Spinal  Delivery Type:  bilateral salpingectomy  Perineum: OBPERINEUM: Intact  Feeding method: Bottle    Infant: female  infant;    Weight: 2890 g (6 lb 5.9 oz)     APGARS: 5  @ 1 minute / 8  @ 5 minutes   Venous Blood Gas: No results found for: PHCVEN   Arterial Blood Gas: No results found for: PHCART     Hospital Course     Hospital Course:  Lovely Daniels is a 26 y.o.  37w0d who presented on 2022 for medically indicated delivery for mild preeclampsia.  The patient underwent a repeat low transverse  section without complication.  Please see documentation for full description of procedure.  Her postpartum course was uncomplicated and she was deemed stable for discharge on hospital day #2.    Day of Discharge     Vital Signs:  Temp:  [97 °F (36.1 °C)-97.8 °F (36.6 °C)] 97.8 °F (36.6 °C)  Heart Rate:  [101-102] 102  Resp:  [18-20] 20  BP: (113-130)/(58-76) 113/62    Pertinent  and/or Most Recent Results     LAB RESULTS:       Lab 22  0848   WBC 15.23*   HEMOGLOBIN 9.7*   HEMATOCRIT 32.0*   PLATELETS 392   MCV 78.6*         Lab 22  0848   SODIUM 134*    POTASSIUM 4.2   CHLORIDE 102   CO2 20.2*   ANION GAP 11.8   BUN 7   CREATININE 0.56*   GLUCOSE 94   CALCIUM 9.4         Lab 01/08/22  0848   TOTAL PROTEIN 6.6   ALBUMIN 3.10*   GLOBULIN 3.5   ALT (SGPT) 9   AST (SGOT) 14   BILIRUBIN 0.3   ALK PHOS 315*             Lab 01/08/22  0923   ABO TYPING A   RH TYPING Positive   ANTIBODY SCREEN Negative     URINALYSIS@  Microbiology Results (last 10 days)     Procedure Component Value - Date/Time    COVID PRE-OP / PRE-PROCEDURE SCREENING ORDER (NO ISOLATION) - Swab, Nasopharynx [[id number]]  (Normal) Collected: 01/04/22 1109    Lab Status: Final result Specimen: Swab from Nasopharynx Updated: 01/05/22 1721    Narrative:      The following orders were created for panel order COVID PRE-OP / PRE-PROCEDURE SCREENING ORDER (NO ISOLATION) - Swab, Nasopharynx.  Procedure                               Abnormality         Status                     ---------                               -----------         ------                     COVID-19,APTIMA PANTHER(...[[id number]]  Normal              Final result                 Please view results for these tests on the individual orders.    COVID-19,APTIMA PANTHER(BENJIE),[facility], NP/OP SWAB IN UTM/VTM/SALINE TRANSPORT MEDIA,24 HR TAT - Swab, Nasopharynx [[id number]]  (Normal) Collected: 01/04/22 1109    Lab Status: Final result Specimen: Swab from Nasopharynx Updated: 01/05/22 1721     COVID19 Not Detected    Narrative:      Fact sheet for providers: https://www.fda.gov/media/549098/download     Fact sheet for patients: https://www.fda.gov/media/654964/download    Test performed by RT PCR.             Discharge Details        Discharge Medications      New Medications      Instructions Start Date   acetaminophen 500 MG tablet  Commonly known as: TYLENOL   1,000 mg, Oral, Every 6 Hours      ibuprofen 800 MG tablet  Commonly known as: ADVIL,MOTRIN   800 mg, Oral, Every 8 Hours Scheduled      oxyCODONE 5 MG immediate release  tablet  Commonly known as: ROXICODONE   5 mg, Oral, Every 6 Hours PRN         Continue These Medications      Instructions Start Date   albuterol sulfate  (90 Base) MCG/ACT inhaler  Commonly known as: PROVENTIL HFA;VENTOLIN HFA;PROAIR HFA   2 puffs, Inhalation, Every 4 Hours PRN      famotidine 20 MG tablet  Commonly known as: PEPCID   20 mg, Oral, 2 Times Daily PRN      ferrous sulfate 325 (65 FE) MG tablet   325 mg, Oral, Every Other Day      Prenatal Vitamin 27-0.8 MG tablet   1 tablet, Oral, Daily         Stop These Medications    aspirin 81 MG EC tablet            No Known Allergies    Discharge Disposition:   Home, self-care    Discharge Condition:  Good    Diet:   Regular    Discharge Activity:   See discharge instructions    Follow Up:  Follow-up in 1 week for blood pressure and staple removal.  Follow-up 4 weeks thereafter for routine postpartum care    Electronically signed by Jaguar Dumont MD, 01/10/22, 8:48 AM EST.

## 2022-01-11 LAB
CYTO UR: NORMAL
LAB AP CASE REPORT: NORMAL
LAB AP CLINICAL INFORMATION: NORMAL
LAB AP DIAGNOSIS COMMENT: NORMAL
PATH REPORT.FINAL DX SPEC: NORMAL
PATH REPORT.GROSS SPEC: NORMAL

## 2022-01-14 ENCOUNTER — POSTPARTUM VISIT (OUTPATIENT)
Dept: OBSTETRICS AND GYNECOLOGY | Facility: CLINIC | Age: 27
End: 2022-01-14

## 2022-01-14 VITALS
DIASTOLIC BLOOD PRESSURE: 85 MMHG | SYSTOLIC BLOOD PRESSURE: 137 MMHG | HEIGHT: 62 IN | BODY MASS INDEX: 50.97 KG/M2 | WEIGHT: 277 LBS | HEART RATE: 111 BPM

## 2022-01-14 DIAGNOSIS — Z01.30 BLOOD PRESSURE CHECK: Primary | ICD-10-CM

## 2022-01-14 PROCEDURE — 0503F POSTPARTUM CARE VISIT: CPT | Performed by: STUDENT IN AN ORGANIZED HEALTH CARE EDUCATION/TRAINING PROGRAM

## 2022-01-14 NOTE — PROGRESS NOTES
"POSTPARTUM Follow Up Visit    CC:  Postpartum - BP and staples removal     HPI:  Lovely Daniels is a 26 y.o.  denies fevers, chills, shortness of breath, chest pain. Patient denies any headache, visual disturbances, new onset nausea vomiting, right upper quadrant pain, or new onset swelling.        Antepartum or Postpartum complications: Elevated BMI- pre-pregnancy, Anemia, Prior , Pre-eclampsia  Delivery type:   with salpingectomy bilateral  Perineum: Intact  Feeding: Bottle     Pain:  No  Vaginal Bleeding:  No    Plans for BC:  Bilateral salpingectomy  Last PAP:   Last Completed Pap Smear          PAP SMEAR (Every 3 Years) Next due on 3/27/2022    2019  SCANNED - PAP SMEAR                /85   Pulse 111   Ht 157.5 cm (62\")   Wt 126 kg (277 lb)   LMP 2021 (Approximate)   BMI 50.66 kg/m²     Physical Exam  Vitals and nursing note reviewed.   Constitutional:       General: She is not in acute distress.     Appearance: Normal appearance. She is obese. She is not toxic-appearing.   HENT:      Head: Normocephalic and atraumatic.   Eyes:      Extraocular Movements: Extraocular movements intact.      Conjunctiva/sclera: Conjunctivae normal.   Cardiovascular:      Pulses: Normal pulses.   Pulmonary:      Effort: Pulmonary effort is normal.   Abdominal:      General: There is no distension.      Palpations: Abdomen is soft.      Tenderness: There is no abdominal tenderness.      Comments: Staples well approximated, staples removed.  No erythema or induration.   Musculoskeletal:      Cervical back: Normal range of motion.   Skin:     General: Skin is warm and dry.   Neurological:      Mental Status: She is alert and oriented to person, place, and time.   Psychiatric:         Mood and Affect: Mood normal.         Behavior: Behavior normal.         Thought Content: Thought content normal.           ASSESSMENT AND PLAN:  Lovely Daniels is a 26 y.o.  presenting for blood " pressure check as well as staple removal.  Pfannenstiel incision healing well.  Blood pressure within normal limits.  Patient given warning signs for preeclampsia.  Follow-up in 4 weeks not started on blood pressure agent.    Diagnoses and all orders for this visit:    1. Blood pressure check (Primary)        Counseling: Return to office in 4 weeks for postpartum follow-up        Follow Up:  Return in about 4 weeks (around 2/11/2022) for Next scheduled follow up.          Jaguar Dumont MD  01/14/2022

## 2022-02-11 ENCOUNTER — POSTPARTUM VISIT (OUTPATIENT)
Dept: OBSTETRICS AND GYNECOLOGY | Facility: CLINIC | Age: 27
End: 2022-02-11

## 2022-02-11 VITALS
DIASTOLIC BLOOD PRESSURE: 70 MMHG | SYSTOLIC BLOOD PRESSURE: 117 MMHG | HEART RATE: 74 BPM | BODY MASS INDEX: 44.35 KG/M2 | WEIGHT: 241 LBS | HEIGHT: 62 IN

## 2022-02-11 DIAGNOSIS — N89.8 VAGINAL DISCHARGE: Primary | ICD-10-CM

## 2022-02-11 PROBLEM — O09.299 HX OF PREECLAMPSIA, PRIOR PREGNANCY, CURRENTLY PREGNANT: Status: RESOLVED | Noted: 2021-09-21 | Resolved: 2022-02-11

## 2022-02-11 PROBLEM — O14.00 ANTEPARTUM MILD PREECLAMPSIA: Status: RESOLVED | Noted: 2021-12-26 | Resolved: 2022-02-11

## 2022-02-11 PROBLEM — O09.32 SUPERVISION OF PREGNANCY WITH INSUFFICIENT ANTENATAL CARE IN SECOND TRIMESTER: Status: RESOLVED | Noted: 2021-10-29 | Resolved: 2022-02-11

## 2022-02-11 PROBLEM — Z98.891 PREVIOUS CESAREAN SECTION: Status: RESOLVED | Noted: 2021-09-21 | Resolved: 2022-02-11

## 2022-02-11 PROBLEM — Z98.891 H/O: C-SECTION: Status: RESOLVED | Noted: 2022-01-08 | Resolved: 2022-02-11

## 2022-02-11 PROBLEM — O99.019 MATERNAL ANEMIA IN PREGNANCY, ANTEPARTUM: Status: RESOLVED | Noted: 2021-11-01 | Resolved: 2022-02-11

## 2022-02-11 LAB
CANDIDA SPECIES: NEGATIVE
GARDNERELLA VAGINALIS: NEGATIVE
T VAGINALIS DNA VAG QL PROBE+SIG AMP: NEGATIVE

## 2022-02-11 PROCEDURE — 87660 TRICHOMONAS VAGIN DIR PROBE: CPT | Performed by: STUDENT IN AN ORGANIZED HEALTH CARE EDUCATION/TRAINING PROGRAM

## 2022-02-11 PROCEDURE — 0503F POSTPARTUM CARE VISIT: CPT | Performed by: STUDENT IN AN ORGANIZED HEALTH CARE EDUCATION/TRAINING PROGRAM

## 2022-02-11 PROCEDURE — 87510 GARDNER VAG DNA DIR PROBE: CPT | Performed by: STUDENT IN AN ORGANIZED HEALTH CARE EDUCATION/TRAINING PROGRAM

## 2022-02-11 PROCEDURE — 87480 CANDIDA DNA DIR PROBE: CPT | Performed by: STUDENT IN AN ORGANIZED HEALTH CARE EDUCATION/TRAINING PROGRAM

## 2022-02-11 NOTE — PROGRESS NOTES
"POSTPARTUM Follow Up Visit    CC:  Postpartum C/S on 2022    HPI:      Antepartum or Postpartum complications: Elevated BMI- pre-pregnancy, Pre-eclampsia  Delivery type:   bilateral salpingectomy  Perineum: Intact  Feeding: Bottle     Pain:  No  Vaginal Bleeding:  Yes  EPDS score: 8  Plans for BC:  Bilateral salpingectomy  Last PAP:   Last Completed Pap Smear          PAP SMEAR (Every 3 Years) Next due on 3/27/2022    2019  SCANNED - PAP SMEAR                /70   Pulse 74   Ht 157.5 cm (62\")   Wt 109 kg (241 lb)   LMP 2021 (Approximate)   BMI 44.08 kg/m²     Physical Exam  Vitals and nursing note reviewed. Exam conducted with a chaperone present.   Constitutional:       General: She is not in acute distress.     Appearance: Normal appearance. She is obese. She is not toxic-appearing.   HENT:      Head: Normocephalic and atraumatic.   Eyes:      Extraocular Movements: Extraocular movements intact.      Conjunctiva/sclera: Conjunctivae normal.   Cardiovascular:      Pulses: Normal pulses.   Pulmonary:      Effort: Pulmonary effort is normal.   Abdominal:      General: There is no distension.      Palpations: Abdomen is soft.      Tenderness: There is no abdominal tenderness.      Comments: Well healed pfannenstiel incision    Genitourinary:     General: Normal vulva.      Exam position: Lithotomy position.      Labia:         Right: No tenderness, lesion or injury.         Left: No tenderness, lesion or injury.       Vagina: Normal.      Cervix: Discharge present.      Uterus: Normal.       Adnexa: Right adnexa normal and left adnexa normal.   Musculoskeletal:      Cervical back: Normal range of motion.   Skin:     General: Skin is warm and dry.   Neurological:      Mental Status: She is alert and oriented to person, place, and time.   Psychiatric:         Mood and Affect: Mood normal.         Behavior: Behavior normal.         Thought Content: Thought content normal. "           ASSESSMENT AND PLAN:  Lovely Daniels is a 26 y.o.  doing well postpartum; exercise and diet discussed with patient. Currently down 50#.  Establish follow up with PCP. Normal PAP smear. UPT for vaginal bleeding was negative.     Diagnoses and all orders for this visit:    1. Vaginal discharge (Primary)  -     Gardnerella vaginalis, Trichomonas vaginalis, Candida albicans, DNA - Swab, Vagina    2. Postpartum follow-up        Counseling:  May resume intercourse  May resume normal activities  Core strengthening exercises reviewed and recommended  Kegel exercises reviewed and recommended  Ok to return to work/school      Follow Up:  Return in about 1 year (around 2023) for Annual physical.        Jaguar Dumont MD  2022

## 2022-07-27 ENCOUNTER — HOSPITAL ENCOUNTER (EMERGENCY)
Facility: HOSPITAL | Age: 27
Discharge: HOME | End: 2022-07-27
Attending: EMERGENCY MEDICINE | Admitting: EMERGENCY MEDICINE

## 2022-07-27 VITALS
SYSTOLIC BLOOD PRESSURE: 134 MMHG | OXYGEN SATURATION: 100 % | RESPIRATION RATE: 18 BRPM | HEART RATE: 87 BPM | WEIGHT: 213.85 LBS | TEMPERATURE: 97.8 F | BODY MASS INDEX: 40.37 KG/M2 | DIASTOLIC BLOOD PRESSURE: 85 MMHG | HEIGHT: 61 IN

## 2022-07-27 DIAGNOSIS — K04.7 DENTAL ABSCESS: Primary | ICD-10-CM

## 2022-07-27 PROCEDURE — 99282 EMERGENCY DEPT VISIT SF MDM: CPT

## 2022-07-27 RX ORDER — CLINDAMYCIN HYDROCHLORIDE 150 MG/1
450 CAPSULE ORAL 3 TIMES DAILY
Qty: 63 CAPSULE | Refills: 0 | Status: SHIPPED | OUTPATIENT
Start: 2022-07-27 | End: 2022-08-03

## 2023-02-03 RX ORDER — ALBUTEROL SULFATE 90 UG/1
AEROSOL, METERED RESPIRATORY (INHALATION)
Qty: 18 G | Refills: 0 | Status: SHIPPED | OUTPATIENT
Start: 2023-02-03

## 2023-05-12 ENCOUNTER — HOSPITAL ENCOUNTER (EMERGENCY)
Facility: HOSPITAL | Age: 28
Discharge: HOME | End: 2023-05-13
Payer: COMMERCIAL

## 2023-05-12 VITALS
OXYGEN SATURATION: 100 % | RESPIRATION RATE: 18 BRPM | BODY MASS INDEX: 40.2 KG/M2 | WEIGHT: 218.48 LBS | HEART RATE: 96 BPM | SYSTOLIC BLOOD PRESSURE: 142 MMHG | DIASTOLIC BLOOD PRESSURE: 86 MMHG | HEIGHT: 62 IN | TEMPERATURE: 97.8 F

## 2023-05-12 PROCEDURE — 99281 EMR DPT VST MAYX REQ PHY/QHP: CPT

## 2023-05-13 NOTE — ED PROVIDER NOTES
"Time: 10:51 PM EDT  Date of encounter:  2023  Independent Historian/Clinical History and Information was obtained by:     Chief Complaint   Patient presents with   • Exposure to STD       History is limited by:     History of Present Illness:  Patient is a 28 y.o. year old female who presents to the emergency department for evaluation of possible STD exposure, specifically herpes.  She denies any sores, vaginal discharge, pain, dysuria.  Patient does appear to have been crying recently and says \"I am an emotional wreck\".    HPI    Patient Care Team  Primary Care Provider: Shannan De La Cruz DO    Past Medical History:     No Known Allergies  Past Medical History:   Diagnosis Date   • Antepartum mild preeclampsia 2021 rules in 2021   • Asthma    •  delivery delivered 2022   • Gonorrhea    • H/O:  2022   • Hx of preeclampsia, prior pregnancy, currently pregnant 2021    10/29/2021 compliant with aspirin.  Second elevation in blood pressure today 10/29/2021 patient asymptomatic.  Will get baseline labs and firms of CBC and CMP and UPCR.  If elevated UPCR consider doing 24-hour urine collection.  21 - has not collected 24-urine to date, encouraged to collect and submit   • Maternal anemia in pregnancy, antepartum 2021 H&H essentially unchanged from 1 month ago.  Reports compliance with iron at last visit.  Recheck CBC in 1 month if CBC remains low recommendation for iron infusion.   • Preeclampsia     G1   • Previous  section 2021    10/29/2021 Desires repeat  section; would like had tubes tied. Signed 2021   • Supervision of pregnancy with insufficient  care in second trimester 10/29/2021    Initial visit: PNL: wnl  PAP/GC/CT/Urine culture: Blood type: A+/neg  Hx of HSV: no   Genetic testing:     Vaccinations: COVID: Recommendation made 10/29/2021  Influenza: Recommendation made 10/29/2021   24-28 weeks: " "10/29/2021 1hr GCT: Hct/Plt: Tdap Rx -given 10/29/2021  Rhogam indicated:  No   Third Trimester: GBS Breast pump: Contraception: desires tubal ligation   Ultrasound: Dating:  Anatomy:     Past Surgical History:   Procedure Laterality Date   •  SECTION     •  SECTION WITH TUBAL Bilateral 2022    Procedure:  SECTION REPEAT WITH TUBAL;  Surgeon: Jaguar Dumont MD;  Location: [facility] LABOR DELIVERY;  Service: Obstetrics/Gynecology;  Laterality: Bilateral;   • WISDOM TOOTH EXTRACTION       Family History   Problem Relation Age of Onset   • Diabetes Mother    • Melanoma Maternal Grandmother    • Ovarian cancer Maternal Grandmother    • Breast cancer Other    • Diabetes Maternal Aunt    • Hyperlipidemia Maternal Aunt    • Heart disease Maternal Uncle        Home Medications:  Prior to Admission medications    Medication Sig Start Date End Date Taking? Authorizing Provider   Ventolin  (90 Base) MCG/ACT inhaler INHALE TWO PUFFS BY MOUTH EVERY 4 HOURS AS NEEDED FOR WHEEZING FOR UP TO 30 DAYS. 2/3/23   Shannan De La Cruz DO        Social History:   Social History     Tobacco Use   • Smoking status: Every Day     Packs/day: 0.50     Years: 4.00     Pack years: 2.00     Types: Cigarettes   • Smokeless tobacco: Never   Vaping Use   • Vaping Use: Never used   Substance Use Topics   • Alcohol use: Yes     Comment: social   • Drug use: Never         Review of Systems:  Review of Systems   Genitourinary: Negative for dysuria, genital sores, pelvic pain, vaginal discharge and vaginal pain.        Physical Exam:  /86 (BP Location: Left arm, Patient Position: Sitting)   Pulse 96   Temp 97.8 °F (36.6 °C) (Oral)   Resp 18   Ht 157.5 cm (62\")   Wt 99.1 kg (218 lb 7.6 oz)   SpO2 100%   BMI 39.96 kg/m²     Physical Exam  Constitutional:       General: She is in acute distress.      Appearance: She is not ill-appearing or toxic-appearing.   Cardiovascular:      Rate and Rhythm: Normal rate. "   Pulmonary:      Effort: Pulmonary effort is normal. No respiratory distress.   Neurological:      General: No focal deficit present.      Mental Status: She is alert and oriented to person, place, and time.                  Procedures:  Procedures      Medical Decision Making:      Comorbidities that affect care:        External Notes reviewed:          The following orders were placed and all results were independently analyzed by me:  No orders of the defined types were placed in this encounter.      Medications Given in the Emergency Department:  Medications - No data to display     ED Course:    The patient was initially evaluated in the triage area where orders were placed. The patient was later dispositioned by MAGDA Aguillon.      The patient was advised to stay for completion of workup which includes but is not limited to communication of labs and radiological results, reassessment and plan. The patient was advised that leaving prior to disposition by a provider could result in critical findings that are not communicated to the patient.          Labs:    Lab Results (last 24 hours)     ** No results found for the last 24 hours. **           Imaging:    No Radiology Exams Resulted Within Past 24 Hours      Differential Diagnosis and Discussion:              [facility]         Patient Care Considerations:          Consultants/Shared Management Plan:        Social Determinants of Health:          Disposition and Care Coordination:    Eloped: This patient has left the emergency department or waiting room with no communication to myself, nursing or administrative staff. There was no opportunity to discuss the patient's decision to leave, provide medical advice or discuss alternatives to. The staff has made efforts to locate patient without success.        Final diagnoses:   None        ED Disposition     None          This medical record created using voice recognition software.           Farrah Garcia,  APRN  05/13/23 [id number]

## 2023-06-18 ENCOUNTER — HOSPITAL ENCOUNTER (EMERGENCY)
Facility: HOSPITAL | Age: 28
Discharge: HOME | End: 2023-06-18
Attending: EMERGENCY MEDICINE | Admitting: EMERGENCY MEDICINE
Payer: COMMERCIAL

## 2023-06-18 VITALS
WEIGHT: 219.58 LBS | DIASTOLIC BLOOD PRESSURE: 89 MMHG | OXYGEN SATURATION: 100 % | RESPIRATION RATE: 18 BRPM | TEMPERATURE: 97.6 F | SYSTOLIC BLOOD PRESSURE: 143 MMHG | HEART RATE: 104 BPM | HEIGHT: 62 IN | BODY MASS INDEX: 40.41 KG/M2

## 2023-06-18 DIAGNOSIS — K02.9 PAIN DUE TO DENTAL CARIES: Primary | ICD-10-CM

## 2023-06-18 PROCEDURE — 99283 EMERGENCY DEPT VISIT LOW MDM: CPT

## 2023-06-18 RX ORDER — PENICILLIN V POTASSIUM 500 MG/1
500 TABLET ORAL 4 TIMES DAILY
Qty: 40 TABLET | Refills: 0 | Status: SHIPPED | OUTPATIENT
Start: 2023-06-18 | End: 2023-06-28

## 2023-06-18 RX ORDER — PENICILLIN V POTASSIUM 250 MG/1
500 TABLET ORAL ONCE
Status: COMPLETED | OUTPATIENT
Start: 2023-06-18 | End: 2023-06-18

## 2023-06-18 RX ADMIN — PENICILLIN V POTASSIUM 500 MG: 250 TABLET, FILM COATED ORAL at 21:39

## 2023-06-19 NOTE — ED PROVIDER NOTES
Time: 9:16 PM EDT  Date of encounter:  2023  Independent Historian/Clinical History and Information was obtained by:   Patient    History is limited by: N/A    Chief Complaint: Dental abscess.      History of Present Illness:  Patient is a 28 y.o. year old female who presents to the emergency department for evaluation of dental abscess.  Patient admits to having poor dentition.  Patient states that she started noticing facial swelling 2 days ago.  Patient states the swelling is now spreading up her cheek and is causing numbness on the left side of her face.  Patient denies fever, chills, drainage from possible abscess.  Patient states she does have a dentist but does not have an appointment scheduled for tooth extraction.  Patient denies taking any medication for pain.  Patient denies any trouble swallowing, sore throat, cough.    HPI    Patient Care Team  Primary Care Provider: Shannan De La Cruz DO    Past Medical History:     No Known Allergies  Past Medical History:   Diagnosis Date    Antepartum mild preeclampsia 2021 rules in 2021    Asthma      delivery delivered 2022    Gonorrhea     H/O:  2022    Hx of preeclampsia, prior pregnancy, currently pregnant 2021    10/29/2021 compliant with aspirin.  Second elevation in blood pressure today 10/29/2021 patient asymptomatic.  Will get baseline labs and firms of CBC and CMP and UPCR.  If elevated UPCR consider doing 24-hour urine collection.  21 - has not collected 24-urine to date, encouraged to collect and submit    Maternal anemia in pregnancy, antepartum 2021 H&H essentially unchanged from 1 month ago.  Reports compliance with iron at last visit.  Recheck CBC in 1 month if CBC remains low recommendation for iron infusion.    Preeclampsia     G1    Previous  section 2021    10/29/2021 Desires repeat  section; would like had tubes tied. Signed 2021     Supervision of pregnancy with insufficient  care in second trimester 10/29/2021    Initial visit: PNL: wnl  PAP/GC/CT/Urine culture: Blood type: A+/neg  Hx of HSV: no   Genetic testing:     Vaccinations: COVID: Recommendation made 10/29/2021  Influenza: Recommendation made 10/29/2021   24-28 weeks: 10/29/2021 1hr GCT: Hct/Plt: Tdap Rx -given 10/29/2021  Rhogam indicated:  No   Third Trimester: GBS Breast pump: Contraception: desires tubal ligation   Ultrasound: Dating:  Anatomy:     Past Surgical History:   Procedure Laterality Date     SECTION       SECTION WITH TUBAL Bilateral 2022    Procedure:  SECTION REPEAT WITH TUBAL;  Surgeon: Jaguar Dumont MD;  Location: [facility] LABOR DELIVERY;  Service: Obstetrics/Gynecology;  Laterality: Bilateral;    WISDOM TOOTH EXTRACTION       Family History   Problem Relation Age of Onset    Diabetes Mother     Melanoma Maternal Grandmother     Ovarian cancer Maternal Grandmother     Breast cancer Other     Diabetes Maternal Aunt     Hyperlipidemia Maternal Aunt     Heart disease Maternal Uncle        Home Medications:  Prior to Admission medications    Medication Sig Start Date End Date Taking? Authorizing Provider   Ventolin  (90 Base) MCG/ACT inhaler INHALE TWO PUFFS BY MOUTH EVERY 4 HOURS AS NEEDED FOR WHEEZING FOR UP TO 30 DAYS. 2/3/23   Shannan De La Cruz DO        Social History:   Social History     Tobacco Use    Smoking status: Every Day     Packs/day: 0.50     Years: 4.00     Pack years: 2.00     Types: Cigarettes    Smokeless tobacco: Never   Vaping Use    Vaping Use: Never used   Substance Use Topics    Alcohol use: Yes     Comment: social    Drug use: Never         Review of Systems:  Review of Systems   Constitutional:  Negative for chills and fever.   HENT:  Positive for dental problem. Negative for sore throat and trouble swallowing.    Respiratory:  Negative for cough.    Neurological:  Positive for numbness.      Physical  "Exam:  /89 (BP Location: Left arm, Patient Position: Sitting)   Pulse 104   Temp 97.6 °F (36.4 °C) (Oral)   Resp 18   Ht 157.5 cm (62\")   Wt 99.6 kg (219 lb 9.3 oz)   LMP 05/19/2023   SpO2 100%   BMI 40.16 kg/m²   .  Physical Exam  Vitals and nursing note reviewed.   Constitutional:       General: She is not in acute distress.     Appearance: Normal appearance. She is not ill-appearing or toxic-appearing.   HENT:      Head: Normocephalic and atraumatic.      Nose: Nose normal.      Mouth/Throat:      Mouth: Mucous membranes are moist.      Dentition: Abnormal dentition. No dental abscesses.      Pharynx: Uvula midline. No pharyngeal swelling, oropharyngeal exudate, posterior oropharyngeal erythema or uvula swelling.      Tonsils: No tonsillar abscesses. 0 on the left.        Comments: Patient has poor dentition throughout.  No sublingual induration, lingual swelling.  Eyes:      Extraocular Movements: Extraocular movements intact.      Pupils: Pupils are equal, round, and reactive to light.   Cardiovascular:      Rate and Rhythm: Normal rate and regular rhythm.      Heart sounds: Normal heart sounds.   Pulmonary:      Effort: Pulmonary effort is normal.   Abdominal:      General: Abdomen is flat. Bowel sounds are normal.      Palpations: Abdomen is soft.   Musculoskeletal:         General: Normal range of motion.      Cervical back: Normal range of motion and neck supple.   Skin:     General: Skin is warm and dry.      Coloration: Skin is not pale.   Neurological:      General: No focal deficit present.      Mental Status: She is alert and oriented to person, place, and time.   Psychiatric:         Mood and Affect: Mood normal.         Behavior: Behavior normal.                Procedures:  Procedures      Medical Decision Making:      Comorbidities that affect care:    Asthma    External Notes reviewed:    None      The following orders were placed and all results were independently analyzed by me:  No " orders of the defined types were placed in this encounter.      Medications Given in the Emergency Department:  Medications   penicillin v potassium (VEETID) tablet 500 mg (has no administration in time range)        ED Course:         Labs:    Lab Results (last 24 hours)       ** No results found for the last 24 hours. **             Imaging:    No Radiology Exams Resulted Within Past 24 Hours      Differential Diagnosis and Discussion:    Dental Pain: Differential diagnosis includes but is not limited to dental caries, periodontitis, pericoronitis, peridental abscess, gingival abscess, apthous stomatitis, allergic stomatitis, acute necrotizing ulcerative gingivitis, herpetic stomatitis.        MDM  Number of Diagnoses or Management Options  Diagnosis management comments: Patient reports emergency department complaining of a possible dental abscess and dental pain.  Patient has poor dentition on physical exam.  No dental abscess appreciated on physical exam there is mild swelling on the left side of her face and jaw.  There is no drainage noted on the inside of her mouth.  Patient was discharged on penicillin and told to take ibuprofen or Tylenol for pain and fever if develops.  Patient given strict return cautions.  Patient states understanding and agreeable to treatment plan.               Patient Care Considerations:    I considered CT soft tissue neck however no sublingual induration or lingual swelling.  I also considered I&D, however no dental abscesses palpated or appreciated on physical exam.      Consultants/Shared Management Plan:    None    Social Determinants of Health:    Patient is independent, reliable, and has access to care.       Disposition and Care Coordination:    Discharged: The patient is suitable and stable for discharge with no need for consideration of observation or admission.    I have explained the patient´s condition, diagnoses and treatment plan based on the information available to  me at this time. I have answered questions and addressed any concerns. The patient has a good  understanding of the patient´s diagnosis, condition, and treatment plan as can be expected at this point. The vital signs have been stable. The patient´s condition is stable and appropriate for discharge from the emergency department.      The patient will pursue further outpatient evaluation with the primary care physician or other designated or consulting physician as outlined in the discharge instructions. They are agreeable to this plan of care and follow-up instructions have been explained in detail. The patient has received these instructions in written format and have expressed an understanding of the discharge instructions. The patient is aware that any significant change in condition or worsening of symptoms should prompt an immediate return to this or the closest emergency department or call to 911.  I have explained discharge medications and the need for follow up with the patient/caretakers. This was also printed in the discharge instructions. Patient was discharged with the following medications and follow up:      Medication List        New Prescriptions      penicillin v potassium 500 MG tablet  Commonly known as: VEETID  Take 1 tablet by mouth 4 (Four) Times a Day for 10 days.               Where to Get Your Medications        These medications were sent to [facility]/pharmacy #[id number] - [place], [place] - [address] - [phone]  - [phone]  [address] [place] [address]      Hours: 24-hours Phone: [phone]   penicillin v potassium 500 MG tablet      Shannan De La Cruz DO  [address] [place] [address] [phone]    In 3 days  As needed       Final diagnoses:   Pain due to dental caries        ED Disposition       ED Disposition   Discharge    Condition   Stable    Comment   --               This medical record created using voice recognition software.             Shabbir,  MACY Swift  06/18/23 [id number]

## 2023-06-19 NOTE — DISCHARGE INSTRUCTIONS
Take antibiotics as prescribed and complete the full course.  Take Tylenol and Motrin as needed for pain and fevers if they develop.  Call your dentist to schedule appointment for tooth instruction soon as possible.  Return to the emergency department if facial swelling significantly worsens or radiates down your neck into your eye, fever that is not being treated with medication, severe worsening of pain, trouble swallowing.  Otherwise follow-up with your dentist.

## 2023-11-30 PROCEDURE — 87660 TRICHOMONAS VAGIN DIR PROBE: CPT

## 2023-11-30 PROCEDURE — 87491 CHLMYD TRACH DNA AMP PROBE: CPT

## 2023-11-30 PROCEDURE — 87591 N.GONORRHOEAE DNA AMP PROB: CPT

## 2023-11-30 PROCEDURE — 87480 CANDIDA DNA DIR PROBE: CPT

## 2023-11-30 PROCEDURE — 87510 GARDNER VAG DNA DIR PROBE: CPT

## 2023-12-01 ENCOUNTER — TELEPHONE (OUTPATIENT)
Dept: URGENT CARE | Facility: CLINIC | Age: 28
End: 2023-12-01
Payer: COMMERCIAL

## 2023-12-01 DIAGNOSIS — N76.0 BACTERIAL VAGINOSIS: Primary | ICD-10-CM

## 2023-12-01 DIAGNOSIS — B96.89 BACTERIAL VAGINOSIS: Primary | ICD-10-CM

## 2023-12-01 RX ORDER — METRONIDAZOLE 500 MG/1
500 TABLET ORAL 2 TIMES DAILY
Qty: 14 TABLET | Refills: 0 | Status: SHIPPED | OUTPATIENT
Start: 2023-12-01 | End: 2023-12-08

## 2023-12-01 NOTE — TELEPHONE ENCOUNTER
Spoke to patient who verified date of birth for patient safety notified of negative STI results.  Notified of positive bacterial vaginosis.  Discussed treatment with Flagyl.  And cautioned patient to avoid alcohol consumption while taking Flagyl as this can cause GI upset.  Patient verbalized understanding no further questions.

## 2024-07-03 RX ORDER — ALBUTEROL SULFATE 90 UG/1
AEROSOL, METERED RESPIRATORY (INHALATION)
Qty: 18 G | Refills: 0 | Status: SHIPPED | OUTPATIENT
Start: 2024-07-03

## 2025-06-03 PROCEDURE — 87591 N.GONORRHOEAE DNA AMP PROB: CPT | Performed by: NURSE PRACTITIONER

## 2025-06-03 PROCEDURE — 87491 CHLMYD TRACH DNA AMP PROBE: CPT | Performed by: NURSE PRACTITIONER

## 2025-06-04 ENCOUNTER — RESULTS FOLLOW-UP (OUTPATIENT)
Dept: URGENT CARE | Facility: CLINIC | Age: 30
End: 2025-06-04
Payer: COMMERCIAL

## 2025-06-04 DIAGNOSIS — A54.9 GONORRHEA: Primary | ICD-10-CM

## 2025-06-04 NOTE — TELEPHONE ENCOUNTER
Attempted to notify patient of positive Gonorrhea infection and need to come back to the clinic for treatment. No answer and voice mailbox was full. Will attempt to call patient again later.           Attempted to call patient for test results.  She did test positive for gonorrhea and needs to come in to have further treatment.  Voicebox was full.  Unable to leave message.  Will try again at a later date.  6/25/2025 at 7:55 AM        Patient called in to get test results.  She states her phone broke today she left that she has not had a phone to be able to contact the facility.  She was informed that she did test positive for gonorrhea.  She is going to be coming in today to get a shot.  6/8/2025 and 11:18 AM.

## 2025-08-03 ENCOUNTER — HOSPITAL ENCOUNTER (EMERGENCY)
Facility: HOSPITAL | Age: 30
Discharge: HOME | End: 2025-08-03
Attending: EMERGENCY MEDICINE | Admitting: EMERGENCY MEDICINE
Payer: COMMERCIAL

## 2025-08-03 VITALS
RESPIRATION RATE: 16 BRPM | DIASTOLIC BLOOD PRESSURE: 94 MMHG | HEART RATE: 81 BPM | BODY MASS INDEX: 43.82 KG/M2 | OXYGEN SATURATION: 100 % | SYSTOLIC BLOOD PRESSURE: 143 MMHG | HEIGHT: 62 IN | TEMPERATURE: 98.4 F | WEIGHT: 238.1 LBS

## 2025-08-03 DIAGNOSIS — A54.9 GONORRHEA: ICD-10-CM

## 2025-08-03 DIAGNOSIS — N76.0 BACTERIAL VAGINOSIS: Primary | ICD-10-CM

## 2025-08-03 DIAGNOSIS — B96.89 BACTERIAL VAGINOSIS: Primary | ICD-10-CM

## 2025-08-03 LAB
BACTERIAL VAGINOSIS VAG-IMP: POSITIVE
C TRACH DNA SPEC QL NAA+PROBE: NOT DETECTED
CANDIDA DNA VAG QL NAA+PROBE: NOT DETECTED
CANDIDA DNA VAG QL NAA+PROBE: NOT DETECTED
HCG INTACT+B SERPL-ACNC: <0.5 MIU/ML
N GONORRHOEA RRNA SPEC QL NAA+PROBE: DETECTED
T VAGINALIS DNA VAG QL NAA+PROBE: NOT DETECTED
TREPONEMA PALLIDUM IGG+IGM AB [PRESENCE] IN SERUM OR PLASMA BY IMMUNOASSAY: NORMAL

## 2025-08-03 PROCEDURE — 25010000002 LIDOCAINE 1 % SOLUTION 10 ML VIAL

## 2025-08-03 PROCEDURE — 84702 CHORIONIC GONADOTROPIN TEST: CPT

## 2025-08-03 PROCEDURE — 86780 TREPONEMA PALLIDUM: CPT

## 2025-08-03 PROCEDURE — 96372 THER/PROPH/DIAG INJ SC/IM: CPT

## 2025-08-03 PROCEDURE — 25010000002 CEFTRIAXONE PER 250 MG

## 2025-08-03 PROCEDURE — 81515 NFCT DS BV&VAGINITIS DNA ALG: CPT

## 2025-08-03 PROCEDURE — 87591 N.GONORRHOEAE DNA AMP PROB: CPT

## 2025-08-03 PROCEDURE — 87491 CHLMYD TRACH DNA AMP PROBE: CPT

## 2025-08-03 PROCEDURE — 99283 EMERGENCY DEPT VISIT LOW MDM: CPT

## 2025-08-03 RX ORDER — FLUCONAZOLE 150 MG/1
150 TABLET ORAL ONCE
Qty: 1 TABLET | Refills: 0 | Status: SHIPPED | OUTPATIENT
Start: 2025-08-03 | End: 2025-08-03

## 2025-08-03 RX ORDER — METRONIDAZOLE 500 MG/1
500 TABLET ORAL 2 TIMES DAILY
Qty: 14 TABLET | Refills: 0 | Status: SHIPPED | OUTPATIENT
Start: 2025-08-03 | End: 2025-08-10

## 2025-08-03 RX ADMIN — LIDOCAINE HYDROCHLORIDE 500 MG: 10 INJECTION, SOLUTION INFILTRATION; PERINEURAL at 10:31

## (undated) DEVICE — TRY CATH FOL ADVANCE SIL W/BAG 16F

## (undated) DEVICE — SUT MONOCRYL 4/0 PS2 27IN Y426H ETY426H

## (undated) DEVICE — INTENDED FOR TISSUE SEPARATION, AND OTHER PROCEDURES THAT REQUIRE A SHARP SURGICAL BLADE TO PUNCTURE OR CUT.: Brand: BARD-PARKER ® CARBON RIB-BACK BLADES

## (undated) DEVICE — DEV TRANSF BLD W/LUER ADPT CA/198

## (undated) DEVICE — SUT MNCRYL 0 CT1 27IN VIL

## (undated) DEVICE — CVR HNDL LT SURG ACCSSRY BLU STRL

## (undated) DEVICE — C SECTION PACK: Brand: MEDLINE INDUSTRIES, INC.

## (undated) DEVICE — VIOLET BRAIDED (POLYGLACTIN 910), SYNTHETIC ABSORBABLE SUTURE: Brand: COATED VICRYL

## (undated) DEVICE — NEEDLE,18GX1.5",REG,BEVEL: Brand: MEDLINE

## (undated) DEVICE — GLV SURG SENSICARE PI ORTHO PF SZ7 LF STRL

## (undated) DEVICE — PAD GRND REM POLYHESIVE A/ DISP

## (undated) DEVICE — SUT GUT CHRM 0 CTX 27IN 864H

## (undated) DEVICE — SUT VIC 3/0 CT1 27IN DYED J338H

## (undated) DEVICE — DEV OPN LIGASURE CRV 180D 36MM 13.5CM  1P/U

## (undated) DEVICE — STERILE POLYISOPRENE POWDER-FREE SURGICAL GLOVES WITH EMOLLIENT COATING: Brand: PROTEXIS